# Patient Record
Sex: FEMALE | Race: WHITE | NOT HISPANIC OR LATINO | Employment: FULL TIME | URBAN - METROPOLITAN AREA
[De-identification: names, ages, dates, MRNs, and addresses within clinical notes are randomized per-mention and may not be internally consistent; named-entity substitution may affect disease eponyms.]

---

## 2023-11-02 PROBLEM — G47.33 OBSTRUCTIVE SLEEP APNEA SYNDROME: Status: ACTIVE | Noted: 2023-11-02

## 2023-11-02 PROBLEM — E78.2 MIXED HYPERLIPIDEMIA: Status: ACTIVE | Noted: 2023-11-02

## 2023-11-02 RX ORDER — NAPROXEN 500 MG/1
1 TABLET ORAL 2 TIMES DAILY PRN
COMMUNITY
Start: 2023-08-10 | End: 2023-11-03 | Stop reason: WASHOUT

## 2023-11-02 RX ORDER — DICLOFENAC SODIUM 10 MG/G
4 GEL TOPICAL
COMMUNITY
End: 2023-11-03 | Stop reason: WASHOUT

## 2023-11-02 RX ORDER — NAPROXEN SODIUM 220 MG
220 TABLET ORAL EVERY 12 HOURS PRN
COMMUNITY
End: 2023-11-03 | Stop reason: WASHOUT

## 2023-11-03 ENCOUNTER — HOSPITAL ENCOUNTER (OUTPATIENT)
Dept: RADIOLOGY | Facility: HOSPITAL | Age: 53
Discharge: HOME | End: 2023-11-03
Payer: COMMERCIAL

## 2023-11-03 ENCOUNTER — OFFICE VISIT (OUTPATIENT)
Dept: PRIMARY CARE | Facility: CLINIC | Age: 53
End: 2023-11-03
Payer: COMMERCIAL

## 2023-11-03 VITALS
BODY MASS INDEX: 41.43 KG/M2 | SYSTOLIC BLOOD PRESSURE: 104 MMHG | OXYGEN SATURATION: 97 % | HEART RATE: 108 BPM | DIASTOLIC BLOOD PRESSURE: 88 MMHG | TEMPERATURE: 97.1 F | HEIGHT: 68 IN | WEIGHT: 273.4 LBS

## 2023-11-03 DIAGNOSIS — G89.29 CHRONIC PAIN OF RIGHT WRIST: Primary | ICD-10-CM

## 2023-11-03 DIAGNOSIS — M25.531 CHRONIC PAIN OF RIGHT WRIST: ICD-10-CM

## 2023-11-03 DIAGNOSIS — M25.531 CHRONIC PAIN OF RIGHT WRIST: Primary | ICD-10-CM

## 2023-11-03 DIAGNOSIS — G89.29 CHRONIC PAIN OF RIGHT WRIST: ICD-10-CM

## 2023-11-03 DIAGNOSIS — Z12.31 BREAST CANCER SCREENING BY MAMMOGRAM: ICD-10-CM

## 2023-11-03 PROCEDURE — 73110 X-RAY EXAM OF WRIST: CPT | Mod: RT,FY

## 2023-11-03 PROCEDURE — 99213 OFFICE O/P EST LOW 20 MIN: CPT | Performed by: FAMILY MEDICINE

## 2023-11-03 PROCEDURE — 1036F TOBACCO NON-USER: CPT | Performed by: FAMILY MEDICINE

## 2023-11-03 ASSESSMENT — PAIN SCALES - GENERAL: PAINLEVEL: 8

## 2023-11-03 ASSESSMENT — PATIENT HEALTH QUESTIONNAIRE - PHQ9
2. FEELING DOWN, DEPRESSED OR HOPELESS: NOT AT ALL
1. LITTLE INTEREST OR PLEASURE IN DOING THINGS: NOT AT ALL
SUM OF ALL RESPONSES TO PHQ9 QUESTIONS 1 AND 2: 0

## 2023-11-03 NOTE — PATIENT INSTRUCTIONS
Problem List Items Addressed This Visit    None  Visit Diagnoses         Codes    Chronic pain of right wrist    -  Primary M25.531, G89.29            Additional Visit Plans:  Let us get the xray to see what is going on.    Take 1000mg Tylenol 3 times a day for the next few days. Apply heat and ice as tolerated.    Plan to see the hand specialist.    I do not suspect gout.     Patient Care Team:  Bryon Le DO as PCP - General (Family Medicine)    Bryon Le DO   11/03/23   3:27 PM

## 2023-11-03 NOTE — PROGRESS NOTES
Outpatient Visit Note    Chief Complaint   Patient presents with    Wrist Pain     Has been having R wrist pain for about 2 months on and off       HPI:  Camilla Doty is a 52 y.o. female here for continued wrist pain.    I saw her in  August at which time we discussed that she was having right wrist pain for  1 week.  She felt maybe she lept on it funny,  woke up with her right wrist feeling stiff, pain improved with movement.  No known trauma.  No pain at rest.  Experiencing pain with opening up Or Turning Her Wrist.  The Pain Was More along the Distal Forearm Really Than at Her Wrist.  Some Slight Swelling Just Proximal to the Wrist.  I Diagnosed Her with Tendinitis and Recommended She Wear a Wrist Stabilizer Especially While Sleeping and Use Naproxen As Needed.  Continue Voltaren Gel, Apply Heat and Ice As Tolerated.    She wore the wrist stabilizer for two days. Felt this was hurting more with the pressure so she stopped that. The naproxen did not help much but then she had to stop it as they found some ulcers on her colonoscopy.     She still has the swelling. Has weakness and pain with turning her wrist or pulling down on things.     Using Tylenol intermittently with some relief. Using Asper Cream which is not helping. Voltaren gel did not work. Has not been doing ice and heat.     Pain is not radiating.       History reviewed. No pertinent past medical history.     Current Medications  Current Outpatient Medications   Medication Instructions    multivit-minerals/folic acid (MULTIVITAMIN GUMMIES ORAL) 2 tablets, oral, Daily    mv-mn/iron/folic acid/herb 190 (VITAMIN D3 COMPLETE ORAL) 1 capsule, oral, Daily        Allergies  No Known Allergies     History reviewed. No pertinent surgical history.  Family History   Problem Relation Name Age of Onset    Breast cancer Mother  43    Prostate cancer Father      Hyperlipidemia Father      Arthritis Father      Heart disease Father      Arthritis Sister       Cancer Father's Brother      BRCA1 Positive Father's Brother          BRCA gene mutation positive    Cancer Paternal Grandfather       Social History     Tobacco Use    Smoking status: Never     Passive exposure: Never    Smokeless tobacco: Never   Vaping Use    Vaping Use: Never used   Substance Use Topics    Alcohol use: Yes     Alcohol/week: 2.0 standard drinks of alcohol     Types: 1 Glasses of wine, 1 Shots of liquor per week    Drug use: Never     Tobacco Use: Low Risk  (11/3/2023)    Patient History     Smoking Tobacco Use: Never     Smokeless Tobacco Use: Never     Passive Exposure: Never        ROS  All pertinent positive symptoms are included in the history of present illness.  All other systems have been reviewed and are negative and noncontributory to this patient's current ailments.    VITAL SIGNS  Vitals:    11/03/23 1507   BP: 104/88   Pulse: 108   Temp: 36.2 °C (97.1 °F)   SpO2: 97%     Vitals:    11/03/23 1507   Weight: 124 kg (273 lb 6.4 oz)      Body mass index is 41.57 kg/m².     PHYSICAL EXAM  GENERAL APPEARANCE: well nourished, well developed, looks like stated age, in no acute distress, not ill or tired appearing, conversing well.   HEENT: no trauma, normocephalic.   NECK: supple without rigidity, no neck mass was observed.   LUNGS: good chest wall expansion. In no acute respiratory distress.   EXTREMITIES:   Just proximal to her right posterior wrist there is focal swelling with tenderness, no increased warmth or erythema.  No palpable bony abnormalities.  SKIN: normal skin color and pigmentation, without rash.   NEUROLOGIC EXAM: CN II-XII grossly intact, normal gait.   PSYCH: mood and affect appropriate; alert and oriented to time, place, person; no difficulty with speech or language.       Assessment/Plan   Problem List Items Addressed This Visit    None  Visit Diagnoses         Codes    Chronic pain of right wrist    -  Primary M25.531, G89.29            Additional Visit Plans:  Let us  get the xray to see what is going on.    Take 1000mg Tylenol 3 times a day for the next few days. Apply heat and ice as tolerated.    Plan to see the hand specialist.    I do not suspect gout.     Patient Care Team:  Bryon Le DO as PCP - General (Family Medicine)    Bryon Le DO   11/03/23   3:27 PM

## 2023-11-09 ENCOUNTER — OFFICE VISIT (OUTPATIENT)
Dept: ORTHOPEDIC SURGERY | Facility: CLINIC | Age: 53
End: 2023-11-09
Payer: COMMERCIAL

## 2023-11-09 VITALS — HEIGHT: 68 IN | BODY MASS INDEX: 41.43 KG/M2 | WEIGHT: 273.37 LBS

## 2023-11-09 DIAGNOSIS — G89.29 CHRONIC PAIN OF RIGHT WRIST: ICD-10-CM

## 2023-11-09 DIAGNOSIS — M77.8 THUMB TENDONITIS: ICD-10-CM

## 2023-11-09 DIAGNOSIS — S63.501A RIGHT WRIST SPRAIN, INITIAL ENCOUNTER: Primary | ICD-10-CM

## 2023-11-09 DIAGNOSIS — M25.531 CHRONIC PAIN OF RIGHT WRIST: ICD-10-CM

## 2023-11-09 PROCEDURE — 99213 OFFICE O/P EST LOW 20 MIN: CPT | Performed by: ORTHOPAEDIC SURGERY

## 2023-11-09 PROCEDURE — 99203 OFFICE O/P NEW LOW 30 MIN: CPT | Performed by: ORTHOPAEDIC SURGERY

## 2023-11-09 PROCEDURE — 1036F TOBACCO NON-USER: CPT | Performed by: ORTHOPAEDIC SURGERY

## 2023-11-09 ASSESSMENT — PAIN SCALES - GENERAL: PAINLEVEL_OUTOF10: 8

## 2023-11-09 ASSESSMENT — PAIN DESCRIPTION - DESCRIPTORS: DESCRIPTORS: ACHING;THROBBING

## 2023-11-09 ASSESSMENT — ENCOUNTER SYMPTOMS
DEPRESSION: 0
OCCASIONAL FEELINGS OF UNSTEADINESS: 0
LOSS OF SENSATION IN FEET: 0

## 2023-11-09 ASSESSMENT — PATIENT HEALTH QUESTIONNAIRE - PHQ9
SUM OF ALL RESPONSES TO PHQ9 QUESTIONS 1 AND 2: 0
1. LITTLE INTEREST OR PLEASURE IN DOING THINGS: NOT AT ALL
2. FEELING DOWN, DEPRESSED OR HOPELESS: NOT AT ALL

## 2023-11-09 ASSESSMENT — PAIN - FUNCTIONAL ASSESSMENT: PAIN_FUNCTIONAL_ASSESSMENT: 0-10

## 2023-11-09 NOTE — PROGRESS NOTES
Subjective      Chief Complaint   Patient presents with    Right Wrist - Pain        No surgery found     HPI:    This 52-year-old young woman presents today with right wrist pain (6-7/10) and swelling both of which are worse with and aggravated by activities including using her wrist.  There is no known trauma.  This right wrist pain and swelling impair this patient's ability to do activities of daily living that are important to her occluding participating in activities with her family.    CARDIOLOGY:   Negative for chest pain, shortness of breath.   RESPIRATORY:   Negative for chest pain, shortness of breath.   MUSCULOSKELETAL:   See HPI for details.   NEUROLOGY:   Negative for tingling, numbness, weakness.    Objective    There were no vitals filed for this visit.    Physical Exam  GENERAL:          General Appearance:  This is a pleasant patient with appropriate affect, in no acute distress.   DERMATOLOGY:          Skin: skin at the neck, upper and lower back, and trunk is intact. There is no evidence of skin rash, skin breakdown or ulceration, or atrophic skin change.   EXTREMITIES:          Vascular:  Right, left hands and feet are warm with good color and pulses. Right and left calf and thigh are nontender and nonswollen.   NEUROLOGICAL:          Orientation:  Patient is alert and oriented to person, place, time and situation. Right and left upper and lower extremity motor and sensory examinations are intact.      MUSCULOSKELETAL: Neck: Nontender. No pain with range of motion. Right hand: Mild tenderness at the dorsum right thumb. Finkelstein's is negative. Phalen's is negative. No pain with gentle ROM at the right hand fingers. Neurovascular is intact.  X-rays of the right wrist done at [facility] on 11-3-2023 and listed below are reviewed with the patient in the office today and are negative for fracture or destruction.    XR wrist right 3+ views    Result Date: 11/4/2023  Interpreted By:  Villa Pool,  STUDY: XR WRIST RIGHT 3+ VIEWS; 11/3/2023 4:16 pm   INDICATION: Signs/Symptoms:chronic right wrist pain (just proximal to the posterior right wrist) with swelling.   COMPARISON: None.   ACCESSION NUMBER(S): [id number]   ORDERING CLINICIAN: GRIFFIN CHING   TECHNIQUE: Right wrist three views   FINDINGS: No fractures or destructive lesions are identified. Joint spaces are maintained. There is no evidence for chondrocalcinosis. No erosions are identified.       No acute pathologic findings are identified.   MACRO: none   Signed by: Villa Pool 11/4/2023 8:50 AM Dictation workstation:   [id number]       Camilla was seen today for pain.  Diagnoses and all orders for this visit:  Right wrist sprain, initial encounter (Primary)  Chronic pain of right wrist  -     Referral to Orthopaedic Surgery  Options are discussed with the patient in detail. The patient is given a prescription for physical therapy to evaluate and treat with gentle strengthening and ROM exercises with modalities as needed. The patient is instructed regarding activity modification and risk for further injury with falling or trauma, ice, physician directed at home gentle strengthening and ROM exercises, and the appropriate use of Tylenol as needed for pain with its potential adverse reactions and side effects. The patient understands.  Return as needed please note that this report has been produced using speech recognition software.  It may contain errors related to grammar, punctuation or spelling.  Electronically signed, but not reviewed.    Chaitanya Gipson MD

## 2023-12-02 ENCOUNTER — HOSPITAL ENCOUNTER (OUTPATIENT)
Dept: RADIOLOGY | Facility: HOSPITAL | Age: 53
Discharge: HOME | End: 2023-12-02
Payer: COMMERCIAL

## 2023-12-02 VITALS — HEIGHT: 68 IN | WEIGHT: 270 LBS | BODY MASS INDEX: 40.92 KG/M2

## 2023-12-02 DIAGNOSIS — Z12.31 BREAST CANCER SCREENING BY MAMMOGRAM: ICD-10-CM

## 2023-12-02 PROCEDURE — 77063 BREAST TOMOSYNTHESIS BI: CPT

## 2023-12-07 ENCOUNTER — EVALUATION (OUTPATIENT)
Dept: OCCUPATIONAL THERAPY | Facility: CLINIC | Age: 53
End: 2023-12-07
Payer: COMMERCIAL

## 2023-12-07 DIAGNOSIS — M25.539 PAIN, WRIST: Primary | ICD-10-CM

## 2023-12-07 DIAGNOSIS — S63.501A RIGHT WRIST SPRAIN, INITIAL ENCOUNTER: ICD-10-CM

## 2023-12-07 DIAGNOSIS — M25.531 RIGHT WRIST PAIN: ICD-10-CM

## 2023-12-07 PROCEDURE — 97110 THERAPEUTIC EXERCISES: CPT | Mod: GO | Performed by: OCCUPATIONAL THERAPIST

## 2023-12-07 PROCEDURE — 97165 OT EVAL LOW COMPLEX 30 MIN: CPT | Mod: GO | Performed by: OCCUPATIONAL THERAPIST

## 2023-12-07 PROCEDURE — L3808 WHFO, RIGID W/O JOINTS: HCPCS | Performed by: OCCUPATIONAL THERAPIST

## 2023-12-07 ASSESSMENT — ACTIVITIES OF DAILY LIVING (ADL): HOME_MANAGEMENT_TIME_ENTRY: 5

## 2023-12-07 ASSESSMENT — PAIN DESCRIPTION - DESCRIPTORS: DESCRIPTORS: ACHING;PINS AND NEEDLES;TINGLING

## 2023-12-07 ASSESSMENT — PAIN - FUNCTIONAL ASSESSMENT: PAIN_FUNCTIONAL_ASSESSMENT: 0-10

## 2023-12-07 ASSESSMENT — PAIN SCALES - GENERAL: PAINLEVEL_OUTOF10: 7

## 2023-12-07 NOTE — PROGRESS NOTES
Occupational Therapy    Evaluation/Treatment    Patient Name: Camilla Doty  MRN: [id number]  : 1970  Today's Date: 23     Time Calculation  Start Time: 1620  Stop Time: 1715  Time Calculation (min): 55 min    Assessment:     OT Assessment Results: Decreased ADL status, Decreased upper extremity range of motion, Decreased upper extremity strength, Decreased fine motor control, Decreased IADLs  Strengths: Ability to acquire knowledge, Coping skills, Insight into problems  Plan:  1 x week for 6 weeks          Subjective   Current Problem:  1. Pain, wrist  PT eval and treat    Follow Up In Occupational Therapy      2. Right wrist sprain, initial encounter  Follow Up In Occupational Therapy      3. Right wrist pain          General:   OT Received On: 23  General  Reason for Referral: ADL impairment R dominant hand with work and IADL  Referred By: Dr. Gipson  Past Medical History Relevant to Rehab: Unknown cause of onset of pain right wrist  Preferred Learning Style: verbal, visual, written  General Comment: pt has family medical hx of RA, but patient herself does not have dx  Precautions:  Splinting: pt has prefab wrist splint  Precautions Comment: pain with prefab wrist brace, unable to use       Pain:  Pain Assessment  Pain Assessment: 0-10  Pain Score: 7  Pain Type: Acute pain  Pain Location: Wrist  Pain Orientation: Right  Pain Descriptors: Aching, Pins and needles, Tingling  Pain Frequency: Intermittent  Clinical Progression: Not changed    Objective   Cognition:  Overall Cognitive Status: Within Functional Limits           Home Living:  Type of Home: House  Lives With: Spouse, Dependent children  Prior Function:  Level of [place]: Independent with ADLs and functional transfers, Independent with homemaking with ambulation  Hand Dominance: Right  IADL History:  Occupation: Full time employment, Works at home  Type of Occupation: office work, [employer]  Leisure and Hobbies: used  to work out, has elipical,   ADL:  ADL Comments: UEFI completed score 23/80 see for details    Modalities:  Modalities Used:  (Recommending patient use ice and heat for assist with pain control, education regarding care and precautions)  Splinting:  Location: Right thumb spica long forearm based  Type: WHFO  Splinting: [employer] Fabrication  Splinting Education: Fitting, Donning, [place], Wear schedule, Precautions  Splinting Comments: Educated regarding healing and timeframe with support and direction       Therapy/Activity: Therapeutic Exercise  Therapeutic Exercise Performed: Yes  Therapeutic Exercise Activity 1: AROM of wrist flexion and extension 10 reps; no stretching, no endrange holds; avoid gripping without splint on; avoid pull, push or lift with force until less pain; recommending hot/cold for wrist to control pain and edema with splint.         Manual Therapy  Manual Therapy Performed: Yes  Manual Therapy Activity 1: STM , edema retrograde massage; passive assist with ROM; no joint mob  Sensation:  Light Touch: Partial deficits in the RUE (pt reports small finger gets numb, also tingly/ numbness in fingers)  Further measure with Carlisle Davion next visit  Strength:  Strength Comments:  strength (Right  5 lbs.; Left  55 lbs.; * pain with loading)  Right key pinch 5 lbs.; Left key pinch 11 lbs.  Right 3 jaw rosie 2 lbs.; Left 3 jaw rosie 10 lbs.       Hand Function:  Hand Function  Gross Grasp: Functional  Outcome Measures:  UEFI score 23/80 see for details      OP EDUCATION:  Education  Individual(s) Educated: Patient  Education Provided: Diagnosis & Precautions, Symptom management, Joint protection and energy conservation, Orthotics wearing schedule and precautions, Risk and benefits of OT discussed with patient or other, POC discussed and agreed upon  Home Program: AROM, Activity modification, Modalities, Orthotic wearing schedule, care and precautions, Handout  issued  Diagnosis and Precautions: negative for Dequervain's; positive CMC OA; positive for scapho/ligament injury; neg for scaphoid; positive for pain with palpation at distal radius;  Right wrist pain and dysfunction with inadequate orthotic support  Risk and Benefits Discussed with Patient/Caregiver/Other: yes  Patient/Caregiver Demonstrated Understanding: yes  Plan of Care Discussed and Agreed Upon: yes  Patient Response to Education: Patient/Caregiver Verbalized Understanding of Information, Patient/Caregiver Performed Return Demonstration of Exercises/Activities, Patient/Caregiver Asked Appropriate Questions    Goals:  Active       OT Problem       Patient will complete carrying groceries with R hand without pain;       Start:  12/07/23    Expected End:  02/07/24            PATIENT WILL ACHIEVE right  STRENGTH OF 15 lbs.  IN THE two position       Start:  12/07/23    Expected End:  02/07/24            PATIENT WILL ACHIEVE right KEY and 3 jaw rosie PINCH STRENGTH OF 10 lbs.       Start:  12/07/23    Expected End:  02/07/24            PATIENT WILL ACHIEVE right WRIST EXT/FLEXION ROM 55/60  DEGREES       Start:  12/07/23    Expected End:  02/07/24            PATIENT WILL DEMONSTRATE ROM MKI 8/10 of THUMB TO 5TH DIGIT        Start:  12/07/23    Expected End:  02/07/24            PATIENT WILL SHOW A SIGNIFICANT CHANGE IN UEFI PATIENT REPORTED OUTCOME TOOL TO DEMONSTRATE SUBJECTIVE IMPROVEMENT       Start:  12/07/23    Expected End:  02/07/24            PATIENT WILL REPORT PAIN OF 2/10 DEMONSTRATING A REDUCTION OF OVERALL PAIN (Progressing)       Start:  12/07/23    Expected End:  02/07/24            PATIENT WILL DEMONSTRATE INDEPENDENCE IN HOME PROGRAM FOR SUPPORT OF PROGRESSION (Progressing)       Start:  12/07/23    Expected End:  02/07/24            PATIENT WILL DEMONSTRATE ABILITY TO FOLLOW wrist sprain injury PRECAUTIONS INDEPENDENTLY (Progressing)       Start:  12/07/23    Expected End:  02/07/24

## 2024-01-04 ENCOUNTER — TREATMENT (OUTPATIENT)
Dept: OCCUPATIONAL THERAPY | Facility: CLINIC | Age: 54
End: 2024-01-04
Payer: COMMERCIAL

## 2024-01-04 DIAGNOSIS — S63.501A RIGHT WRIST SPRAIN, INITIAL ENCOUNTER: ICD-10-CM

## 2024-01-04 DIAGNOSIS — M25.539 PAIN, WRIST: ICD-10-CM

## 2024-01-04 PROCEDURE — 97530 THERAPEUTIC ACTIVITIES: CPT | Mod: GO | Performed by: OCCUPATIONAL THERAPIST

## 2024-01-04 PROCEDURE — 97110 THERAPEUTIC EXERCISES: CPT | Mod: GO | Performed by: OCCUPATIONAL THERAPIST

## 2024-01-04 PROCEDURE — 97140 MANUAL THERAPY 1/> REGIONS: CPT | Mod: GO | Performed by: OCCUPATIONAL THERAPIST

## 2024-01-04 ASSESSMENT — PAIN - FUNCTIONAL ASSESSMENT: PAIN_FUNCTIONAL_ASSESSMENT: 0-10

## 2024-01-04 ASSESSMENT — PAIN SCALES - GENERAL: PAINLEVEL_OUTOF10: 6

## 2024-01-04 NOTE — PROGRESS NOTES
Occupational Therapy    Treatment    Patient Name: Camilla Doty  MRN: [id number]  : 1970  Today's Date: 24     Time Calculation  Start Time: 1645  Stop Time: 1730  Time Calculation (min): 45 min    Assessment:     OT Assessment Results: Decreased ADL status, Decreased upper extremity range of motion, Decreased upper extremity strength, Decreased fine motor control, Decreased IADLs  Strengths: Ability to acquire knowledge  Plan:  1 x week for 6 weeks, this is her second visit due to travel and holidays.          Subjective   Current Problem:  1. Pain, wrist  Follow Up In Occupational Therapy      2. Right wrist sprain, initial encounter  Follow Up In Occupational Therapy        General:      General  Reason for Referral: ADL impairment R dominant hand with work and IADL  Referred By: Dr. Gipson  Past Medical History Relevant to Rehab: Unknown cause of onset of pain right wrist  Preferred Learning Style: verbal, visual, written  General Comment: pt has family medical hx of RA, but patient herself does not have dx  Precautions:  Precautions Comment: pain with prefab wrist brace, unable to use       Pain:  Pain Assessment  Pain Assessment: 0-10  Pain Score: 6  Pain Type: Chronic pain  Pain Location: Wrist  Pain Orientation: Right  Pain Descriptors: Aching, Nagging, Tender, Sharp, Shooting, Sore  Pain Frequency: Constant/continuous  Clinical Progression: Not changed    Objective  Pain better with patient using thumb spica at night, able to sleep, she reports overdoing it with holiday cooking, also pain with active use. Discussed comfort cool restriction for computer and daily use.    Modalities:  Modalities Used:  (Recommending patient use ice and heat for assist with pain control, education regarding care and precautions)  Splinting:  Splinting Education: Fitting, Donning, [place], Wear schedule, Precautions  Splinting Prefabricated: Other (Comment)  Splinting Comments: hand based comfort cool Right  medium plus       Therapy/Activity: Therapeutic Exercise  Therapeutic Exercise Performed: Yes  Therapeutic Exercise Activity 1: AROM of wrist flexion and extension 10 reps; no stretching, no endrange holds; avoid gripping without splint on; avoid pull, push or lift with force until less pain; recommending hot/cold for wrist to control pain and edema with splint.    Therapeutic Activity  Therapeutic Activity Performed: Yes  Therapeutic Activity 1: wrist Abc's with thumb relaxed and fingers relaxed x 2 (completed and performed with fluidotherapy and direct therapist verbal cues and direction)    Manual Therapy  Manual Therapy Performed: Yes  Manual Therapy Activity 1: STM , edema retrograde massage; passive assist with ROM; no joint mob  Sensation: intact to light touch     Strength: Current measures     Right key pinch 5 lbs.; Left key pinch 11 lbs.  Right 3 jaw rosie 2 lbs.; Left 3 jaw rosie 10 lbs.       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; positive CMC OA and FCR tunnel tendinitis; positive for scapho/ligament injury; neg for scaphoid; positive for pain with palpation at distal radius;  Right wrist pain and dysfunction with inadequate orthotic support  Risk and Benefits Discussed with Patient/Caregiver/Other: yes  Patient/Caregiver Demonstrated Understanding: yes  Plan of Care Discussed and Agreed Upon: yes  Patient Response to Education:  Patient/Caregiver Verbalized Understanding of Information, Patient/Caregiver Performed Return Demonstration of Exercises/Activities, Patient/Caregiver Asked Appropriate Questions    Goals:  Active       OT Problem       Patient will complete carrying groceries with R hand without pain; (Progressing)       Start:  12/07/23    Expected End:  02/07/24            PATIENT WILL ACHIEVE right  STRENGTH OF 15 lbs.  IN THE two position       Start:  12/07/23    Expected End:  02/07/24            PATIENT WILL ACHIEVE right KEY and 3 jaw rosie PINCH STRENGTH OF 10 lbs.       Start:  12/07/23    Expected End:  02/07/24            PATIENT WILL ACHIEVE right WRIST EXT/FLEXION ROM 55/60  DEGREES (Progressing)       Start:  12/07/23    Expected End:  02/07/24            PATIENT WILL DEMONSTRATE ROM MKI 8/10 of THUMB TO 5TH DIGIT        Start:  12/07/23    Expected End:  02/07/24            PATIENT WILL SHOW A SIGNIFICANT CHANGE IN UEFI PATIENT REPORTED OUTCOME TOOL TO DEMONSTRATE SUBJECTIVE IMPROVEMENT (Progressing)       Start:  12/07/23    Expected End:  02/07/24            PATIENT WILL REPORT PAIN OF 2/10 DEMONSTRATING A REDUCTION OF OVERALL PAIN (Progressing)       Start:  12/07/23    Expected End:  02/07/24            PATIENT WILL DEMONSTRATE INDEPENDENCE IN HOME PROGRAM FOR SUPPORT OF PROGRESSION (Progressing)       Start:  12/07/23    Expected End:  02/07/24            PATIENT WILL DEMONSTRATE ABILITY TO FOLLOW wrist sprain injury PRECAUTIONS INDEPENDENTLY (Progressing)       Start:  12/07/23    Expected End:  02/07/24

## 2024-01-11 ENCOUNTER — TREATMENT (OUTPATIENT)
Dept: OCCUPATIONAL THERAPY | Facility: CLINIC | Age: 54
End: 2024-01-11
Payer: COMMERCIAL

## 2024-01-11 DIAGNOSIS — M25.539 PAIN, WRIST: ICD-10-CM

## 2024-01-11 DIAGNOSIS — S63.501A RIGHT WRIST SPRAIN, INITIAL ENCOUNTER: ICD-10-CM

## 2024-01-11 PROCEDURE — 97110 THERAPEUTIC EXERCISES: CPT | Mod: GO | Performed by: OCCUPATIONAL THERAPIST

## 2024-01-11 PROCEDURE — 97140 MANUAL THERAPY 1/> REGIONS: CPT | Mod: GO | Performed by: OCCUPATIONAL THERAPIST

## 2024-01-11 PROCEDURE — 97530 THERAPEUTIC ACTIVITIES: CPT | Mod: GO | Performed by: OCCUPATIONAL THERAPIST

## 2024-01-11 ASSESSMENT — PAIN DESCRIPTION - DESCRIPTORS: DESCRIPTORS: ACHING;BURNING

## 2024-01-11 ASSESSMENT — PAIN - FUNCTIONAL ASSESSMENT: PAIN_FUNCTIONAL_ASSESSMENT: 0-10

## 2024-01-11 ASSESSMENT — PAIN SCALES - GENERAL: PAINLEVEL_OUTOF10: 5 - MODERATE PAIN

## 2024-01-11 NOTE — PROGRESS NOTES
Occupational Therapy    Treatment    Patient Name: Camilla Doty  MRN: [id number]  : 1970  Today's Date: 24     Time Calculation  Start Time: 1615  Stop Time: 1700  Time Calculation (min): 45 min    Assessment: Patient less edema and pain with thumb spica and work positioning.     OT Assessment Results: Decreased ADL status, Decreased upper extremity range of motion, Decreased upper extremity strength, Decreased fine motor control, Decreased IADLs  Strengths: Ability to acquire knowledge  Plan:  1 x week for 6 weeks, this is her third visit due to travel and holidays.          Subjective   Current Problem:  1. Pain, wrist  Follow Up In Occupational Therapy      2. Right wrist sprain, initial encounter  Follow Up In Occupational Therapy        General:      General  Reason for Referral: ADL impairment R dominant hand with work and IADL  Referred By: Dr. Gipson  Past Medical History Relevant to Rehab: Unknown cause of onset of pain right wrist  Preferred Learning Style: verbal, visual, written  General Comment: pt has family medical hx of RA, but patient herself does not have dx  Precautions:  Precautions Comment: pain with prefab wrist brace, unable to use       Pain:  Pain Assessment  Pain Assessment: 0-10  Pain Score: 5 - Moderate pain  Pain Type: Chronic pain  Pain Location: Wrist  Pain Orientation: Right  Pain Descriptors: Aching, Burning  Clinical Progression: Not changed    Objective  Pain better with patient using thumb spica at night, able to sleep, but continues to report long work hours, and still overdoing it without breaks. Pain with active use. Discussed comfort cool restriction for computer and daily use.    Modalities:  Modalities Used: Yes (Recommending patient use ice and heat for assist with pain control, education regarding care and precautions)  Modality 1: Timed Ultrasound  Splinting:  Splinting Education: Fitting, [place], [place], Wear schedule, Precautions  Splinting  Prefabricated: Other (Comment)  Splinting Comments: prefab thumb spica       Therapy/Activity: Therapeutic Exercise  Therapeutic Exercise Performed: Yes  Therapeutic Exercise Activity 1: AROM of wrist flexion and extension 10 reps; no stretching, no endrange holds; (avoid gripping without splint on; avoid pull, push or lift with force) Pt completed 2 x 10 reps of wrist flexion /ext and thumb index pinch with wrist flex ext, with warm up in fluidotherapy with direct therapist supervision    Therapeutic Activity  Therapeutic Activity Performed: Yes  Therapeutic Activity 1: rubberband jar x 15 reps; wrist can roll with 10 x 10 count holds and with passive assist flex/ext wrist AAROM painfree stretching as tolerated.    Manual Therapy  Manual Therapy Performed: Yes  Manual Therapy Activity 1: STM , edema retrograde massage; passive assist with ROM; no joint mob  Sensation: intact to light touch     Strength: Current measures     Right key pinch 5 lbs.; Left key pinch 11 lbs.  Right 3 jaw rosie 2 lbs.; Left 3 jaw rosei 10 lbs.       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; positive CMC OA and FCR tunnel tendinitis; positive for scapho/ligament injury; neg for scaphoid; positive for pain with palpation at distal radius;  Right wrist pain and dysfunction with inadequate orthotic support  Risk and Benefits Discussed with Patient/Caregiver/Other:  yes  Patient/Caregiver Demonstrated Understanding: yes  Plan of Care Discussed and Agreed Upon: yes  Patient Response to Education: Patient/Caregiver Verbalized Understanding of Information, Patient/Caregiver Performed Return Demonstration of Exercises/Activities, Patient/Caregiver Asked Appropriate Questions    Goals:  Active       OT Problem       Patient will complete carrying groceries with R hand without pain; (Progressing)       Start:  12/07/23    Expected End:  02/07/24            PATIENT WILL ACHIEVE right  STRENGTH OF 15 lbs.  IN THE two position (Progressing)       Start:  12/07/23    Expected End:  02/07/24            PATIENT WILL ACHIEVE right KEY and 3 jaw rosie PINCH STRENGTH OF 10 lbs. (Progressing)       Start:  12/07/23    Expected End:  02/07/24            PATIENT WILL ACHIEVE right WRIST EXT/FLEXION ROM 55/60  DEGREES (Progressing)       Start:  12/07/23    Expected End:  02/07/24            PATIENT WILL DEMONSTRATE ROM MKI 8/10 of THUMB TO 5TH DIGIT  (Progressing)       Start:  12/07/23    Expected End:  02/07/24            PATIENT WILL SHOW A SIGNIFICANT CHANGE IN UEFI PATIENT REPORTED OUTCOME TOOL TO DEMONSTRATE SUBJECTIVE IMPROVEMENT (Progressing)       Start:  12/07/23    Expected End:  02/07/24            PATIENT WILL REPORT PAIN OF 2/10 DEMONSTRATING A REDUCTION OF OVERALL PAIN (Progressing)       Start:  12/07/23    Expected End:  02/07/24            PATIENT WILL DEMONSTRATE INDEPENDENCE IN HOME PROGRAM FOR SUPPORT OF PROGRESSION (Progressing)       Start:  12/07/23    Expected End:  02/07/24            PATIENT WILL DEMONSTRATE ABILITY TO FOLLOW wrist sprain injury PRECAUTIONS INDEPENDENTLY (Progressing)       Start:  12/07/23    Expected End:  02/07/24

## 2024-01-12 ENCOUNTER — PROCEDURE VISIT (OUTPATIENT)
Dept: ORTHOPEDIC SURGERY | Facility: CLINIC | Age: 54
End: 2024-01-12
Payer: COMMERCIAL

## 2024-01-12 PROCEDURE — L3924 HFO WITHOUT JOINTS PRE OTS: HCPCS | Performed by: STUDENT IN AN ORGANIZED HEALTH CARE EDUCATION/TRAINING PROGRAM

## 2024-01-18 ENCOUNTER — TREATMENT (OUTPATIENT)
Dept: OCCUPATIONAL THERAPY | Facility: CLINIC | Age: 54
End: 2024-01-18
Payer: COMMERCIAL

## 2024-01-18 DIAGNOSIS — M25.531 RIGHT WRIST PAIN: Primary | ICD-10-CM

## 2024-01-18 DIAGNOSIS — S63.501A RIGHT WRIST SPRAIN, INITIAL ENCOUNTER: ICD-10-CM

## 2024-01-18 DIAGNOSIS — M25.539 PAIN, WRIST: ICD-10-CM

## 2024-01-18 PROCEDURE — 97140 MANUAL THERAPY 1/> REGIONS: CPT | Mod: GO | Performed by: OCCUPATIONAL THERAPIST

## 2024-01-18 PROCEDURE — 97110 THERAPEUTIC EXERCISES: CPT | Mod: GO | Performed by: OCCUPATIONAL THERAPIST

## 2024-01-18 PROCEDURE — 97530 THERAPEUTIC ACTIVITIES: CPT | Mod: GO | Performed by: OCCUPATIONAL THERAPIST

## 2024-01-18 ASSESSMENT — PAIN - FUNCTIONAL ASSESSMENT: PAIN_FUNCTIONAL_ASSESSMENT: 0-10

## 2024-01-18 ASSESSMENT — PAIN SCALES - GENERAL: PAINLEVEL_OUTOF10: 7

## 2024-01-18 ASSESSMENT — PAIN DESCRIPTION - DESCRIPTORS: DESCRIPTORS: ACHING;BURNING

## 2024-01-18 NOTE — PROGRESS NOTES
Occupational Therapy    Treatment    Patient Name: Camilla Doty  MRN: [id number]  : 1970  Today's Date: 24     Time Calculation  Start Time: 1610  Stop Time: 1715  Time Calculation (min): 65 min    Assessment: Patient less edema and pain with thumb spica and work positioning.     OT Assessment Results: Decreased ADL status, Decreased upper extremity range of motion, Decreased upper extremity strength, Decreased fine motor control, Decreased IADLs  Plan:  1 x week for 6 weeks, this is her 4 th visit           Subjective   Current Problem:  1. Right wrist pain        2. Pain, wrist  Follow Up In Occupational Therapy      3. Right wrist sprain, initial encounter  Follow Up In Occupational Therapy        General:      General  Reason for Referral: ADL impairment R dominant hand with work and IADL  Referred By: Dr. Gipson  Past Medical History Relevant to Rehab: Unknown cause of onset of pain right wrist  Preferred Learning Style: verbal, visual, written  General Comment: pt has family medical hx of RA, but patient herself does not have dx  Precautions:  Precautions Comment: pain with prefab wrist brace, unable to use, but having a little more success with pain control with thumb spica       Pain:  Pain Assessment  Pain Assessment: 0-10  Pain Score: 7  Pain Type: Chronic pain  Pain Location: Arm  Pain Orientation: Right  Pain Descriptors: Aching, Burning  Clinical Progression: Not changed  Effect of Pain on Daily Activities: a little better with work and use of thumb spica    Objective  Pain better with patient using thumb spica during work she feels she has less pain with work; better with sleep, but continues to report long work hours, and still overdoing it without breaks. Pain with active use. Continue thumb spica comfort cool restriction for computer and daily use.    Modalities:  Modalities Used: Yes (Recommending patient use ice and heat for assist with pain control, education regarding care and  precautions)  Modality 1: Untimed Fluidotherapy  Splinting:  Splinting Education: Precautions, Wear schedule       Therapy/Activity: Therapeutic Exercise  Therapeutic Exercise Performed: Yes  Therapeutic Exercise Activity 1: AROM of wrist flexion and extension 10 reps; no stretching, no endrange holds; (avoid gripping without splint on; avoid pull, push or lift with force) Pt completed 2 x 10 reps of wrist flexion /ext and thumb index pinch with wrist flex ext, with warm up in fluidotherapy with direct therapist supervision  Therapeutic Exercise Activity 2: performed right UE AROM with pulleys 3 mins scaption    Therapeutic Activity  Therapeutic Activity Performed: Yes  Therapeutic Activity 1: completed 8- 12 clothespins pinch key and tip  foam, yellow light resistance, and gripper, tolerated fair, completed 10-12 reps. good effort (no rotation with gripper/hand helper 5 lbs.)  Therapeutic Activity 2: trial of red light rotation with flex bar, pronation/supination    Manual Therapy  Manual Therapy Performed: Yes  Manual Therapy Activity 1: STM , edema retrograde massage; passive assist with ROM; fair tolerance of light joint mob to right wrist joint/carpus glide  Sensation: intact to light touch     Strength: Current measures Current strength measurements     Right key pinch 5 lbs.; Left key pinch 11 lbs.  Right 3 jaw rosie 2 lbs.; Left 3 jaw rosie 10 lbs.       Hand Function: right hand impaired     Outcome Measures:  UEFI score 23/80 see for details      OP EDUCATION:  Education  Individual(s) Educated: Patient  Education Provided: Diagnosis & Precautions, Symptom management, Joint protection and energy conservation, Orthotics wearing schedule and precautions, Risk and benefits of OT discussed with patient or other, POC discussed and agreed upon  Home Program: AROM, Activity modification, Modalities, Orthotic wearing schedule, care and precautions, Handout issued  Diagnosis and Precautions: Positive for  intersection syndrome with palpation/negative for Dequervain's; positive CMC OA and FCR tunnel tendinitis; positive for scapho/ligament injury; neg for scaphoid; positive for pain with palpation at distal radius;  Right wrist pain and dysfunction with inadequate orthotic support  Risk and Benefits Discussed with Patient/Caregiver/Other: yes  Patient/Caregiver Demonstrated Understanding: yes  Plan of Care Discussed and Agreed Upon: yes  Patient Response to Education: Patient/Caregiver Verbalized Understanding of Information, Patient/Caregiver Performed Return Demonstration of Exercises/Activities, Patient/Caregiver Asked Appropriate Questions    Goals:  Active       OT Problem       Patient will complete all goals as listed in evaluation, see notes and attached goals. (Progressing)       Start:  01/18/24    Expected End:  02/07/24

## 2024-01-25 ENCOUNTER — APPOINTMENT (OUTPATIENT)
Dept: OCCUPATIONAL THERAPY | Facility: CLINIC | Age: 54
End: 2024-01-25
Payer: COMMERCIAL

## 2024-01-25 ENCOUNTER — LAB (OUTPATIENT)
Dept: LAB | Facility: LAB | Age: 54
End: 2024-01-25
Payer: COMMERCIAL

## 2024-01-25 DIAGNOSIS — M25.531 PAIN IN RIGHT WRIST: Primary | ICD-10-CM

## 2024-01-25 LAB
BASOPHILS # BLD AUTO: 0.07 X10*3/UL (ref 0–0.1)
BASOPHILS NFR BLD AUTO: 0.9 %
CRP SERPL-MCNC: 0.6 MG/DL (ref 0–2)
EOSINOPHIL # BLD AUTO: 0.16 X10*3/UL (ref 0–0.7)
EOSINOPHIL NFR BLD AUTO: 2.1 %
ERYTHROCYTE [DISTWIDTH] IN BLOOD BY AUTOMATED COUNT: 13.7 % (ref 11.5–14.5)
ERYTHROCYTE [SEDIMENTATION RATE] IN BLOOD BY WESTERGREN METHOD: 46 MM/H (ref 0–30)
HCT VFR BLD AUTO: 38.9 % (ref 36–46)
HGB BLD-MCNC: 12.5 G/DL (ref 12–16)
IMM GRANULOCYTES # BLD AUTO: 0.02 X10*3/UL (ref 0–0.7)
IMM GRANULOCYTES NFR BLD AUTO: 0.3 % (ref 0–0.9)
LYMPHOCYTES # BLD AUTO: 1.87 X10*3/UL (ref 1.2–4.8)
LYMPHOCYTES NFR BLD AUTO: 25 %
MCH RBC QN AUTO: 27.7 PG (ref 26–34)
MCHC RBC AUTO-ENTMCNC: 32.1 G/DL (ref 32–36)
MCV RBC AUTO: 86 FL (ref 80–100)
MONOCYTES # BLD AUTO: 0.46 X10*3/UL (ref 0.1–1)
MONOCYTES NFR BLD AUTO: 6.1 %
NEUTROPHILS # BLD AUTO: 4.9 X10*3/UL (ref 1.2–7.7)
NEUTROPHILS NFR BLD AUTO: 65.6 %
NRBC BLD-RTO: 0 /100 WBCS (ref 0–0)
PLATELET # BLD AUTO: 363 X10*3/UL (ref 150–450)
RBC # BLD AUTO: 4.51 X10*6/UL (ref 4–5.2)
URATE SERPL-MCNC: 3.5 MG/DL (ref 2.5–6.8)
WBC # BLD AUTO: 7.5 X10*3/UL (ref 4.4–11.3)

## 2024-01-25 PROCEDURE — 86038 ANTINUCLEAR ANTIBODIES: CPT

## 2024-01-25 PROCEDURE — 85652 RBC SED RATE AUTOMATED: CPT

## 2024-01-25 PROCEDURE — 85025 COMPLETE CBC W/AUTO DIFF WBC: CPT

## 2024-01-25 PROCEDURE — 36415 COLL VENOUS BLD VENIPUNCTURE: CPT

## 2024-01-25 PROCEDURE — 84550 ASSAY OF BLOOD/URIC ACID: CPT

## 2024-01-25 PROCEDURE — 86140 C-REACTIVE PROTEIN: CPT

## 2024-01-25 PROCEDURE — 86431 RHEUMATOID FACTOR QUANT: CPT

## 2024-01-26 LAB
ANA SER QL HEP2 SUBST: NEGATIVE
RHEUMATOID FACT SER NEPH-ACNC: 13 IU/ML (ref 0–15)

## 2024-02-01 ENCOUNTER — TREATMENT (OUTPATIENT)
Dept: OCCUPATIONAL THERAPY | Facility: CLINIC | Age: 54
End: 2024-02-01
Payer: COMMERCIAL

## 2024-02-01 DIAGNOSIS — M25.531 RIGHT WRIST PAIN: Primary | ICD-10-CM

## 2024-02-01 DIAGNOSIS — M25.539 PAIN, WRIST: ICD-10-CM

## 2024-02-01 DIAGNOSIS — S63.501A RIGHT WRIST SPRAIN, INITIAL ENCOUNTER: ICD-10-CM

## 2024-02-01 PROCEDURE — 97140 MANUAL THERAPY 1/> REGIONS: CPT | Mod: GO | Performed by: OCCUPATIONAL THERAPIST

## 2024-02-01 PROCEDURE — 97110 THERAPEUTIC EXERCISES: CPT | Mod: GO | Performed by: OCCUPATIONAL THERAPIST

## 2024-02-01 PROCEDURE — 97530 THERAPEUTIC ACTIVITIES: CPT | Mod: GO | Performed by: OCCUPATIONAL THERAPIST

## 2024-02-01 ASSESSMENT — PAIN - FUNCTIONAL ASSESSMENT: PAIN_FUNCTIONAL_ASSESSMENT: 0-10

## 2024-02-01 ASSESSMENT — PAIN SCALES - GENERAL: PAINLEVEL_OUTOF10: 5 - MODERATE PAIN

## 2024-02-01 ASSESSMENT — PAIN DESCRIPTION - DESCRIPTORS: DESCRIPTORS: ACHING;BURNING;DISCOMFORT;SHOOTING

## 2024-02-01 NOTE — PROGRESS NOTES
Occupational Therapy    Treatment    Patient Name: Camilla Doty  MRN: [id number]  : 1970  Today's Date: 24     Time Calculation  Start Time: 1702  Stop Time:   Time Calculation (min): 45 min  OT Therapeutic Procedures Time Entry  Manual Therapy Time Entry: 10  Therapeutic Activity Time Entry: 15  Therapeutic Exercise Time Entry: 20    Assessment: Patient reports positioning of wrist helping with work; edema and pain with thumb spica and work positioning.     OT Assessment Results: Decreased ADL status, Decreased upper extremity range of motion, Decreased upper extremity strength, Decreased fine motor control, Decreased IADLs  Plan:  1 x week for 6 weeks, this is her 5th visit ; this is 1 week s/p cortisone injection right wrist Dr. Valerio; Emg to be scheduled per patient    Pt recommended to see primary PCP for results of blood work;        Subjective   Current Problem:  1. Right wrist pain        2. Pain, wrist  Follow Up In Occupational Therapy      3. Right wrist sprain, initial encounter  Follow Up In Occupational Therapy        General:      General  Reason for Referral: ADL impairment R dominant hand with work and IADL  Referred By: Dr. Gipson  Past Medical History Relevant to Rehab: Unknown cause of onset of pain right wrist  Preferred Learning Style: verbal, visual, written  General Comment: pt has family medical hx of RA, but patient herself does not have dx  Precautions:  Precautions Comment: pain with prefab wrist brace, unable to use, but having a little more success with pain control with thumb spica       Pain:  Pain Assessment  Pain Assessment: 0-10  Pain Score: 5 - Moderate pain  Pain Type: Chronic pain  Pain Location: Arm  Pain Orientation: Right  Pain Radiating Towards: pain between 4-5/10 since injection, all around wrist/forearm and thumb  Pain Descriptors: Aching, Burning, Discomfort, Shooting    Objective  Pain better with patient using thumb spica and the wrist rest  during work she feels she has less pain and better with sleep, but continues to report long work hours.  Pain with active use. Continue thumb spica comfort cool restriction for computer and daily use.    Modalities:  Modalities Used: Yes (Recommending patient use ice and heat for assist with pain control, education regarding care and precautions)  Modality 1: Untimed Fluidotherapy  Splinting:  Splinting Education: Precautions, Wear schedule  Splinting Comments: comfort cool thumb spica       Therapy/Activity: Therapeutic Exercise  Therapeutic Exercise Performed: Yes  Therapeutic Exercise Activity 1: AROM of wrist flexion/extension/rotation 10 reps; completed 2 x 10 reps of wrist flexion /ext and thumb index pinch with wrist flex ext, with warm up in fluidotherapy with direct therapist supervision  Therapeutic Exercise Activity 2: performed right UE AROM with pulleys 3 mins scaption  Therapeutic Exercise Activity 3: UBE completed 6 mins forward/reverse    Therapeutic Activity  Therapeutic Activity Performed: Yes  Therapeutic Activity 1: Hand helper /gripper with  rotation 20-15-10 lbs. with neutral start 25 reps (tolerated well)  Therapeutic Activity 2: completed theraputty presses for wt loading; pinching, gripping medium resistance, tolerated fair, weakness with pain reported at fingers/wrist;    Manual Therapy  Manual Therapy Performed: Yes  Manual Therapy Activity 1: STM , edema retrograde massage; passive assist with ROM; fair tolerance of light joint mob to right wrist joint/carpus glide  Sensation: intact to light touch     Strength: Current measures Current strength measurements     Right key pinch 5 lbs.; Left key pinch 11 lbs.  Right 3 jaw rosie 2 lbs.; Left 3 jaw rosie 10 lbs.       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; positive CMC OA and FCR tunnel tendinitis; positive for scapho/ligament injury; neg for scaphoid; positive for pain with palpation at distal radius;  Right wrist pain and dysfunction with inadequate orthotic support  Risk and Benefits Discussed with Patient/Caregiver/Other: yes  Patient/Caregiver Demonstrated Understanding: yes  Plan of Care Discussed and Agreed Upon: yes  Patient Response to Education: Patient/Caregiver Verbalized Understanding of Information, Patient/Caregiver Performed Return Demonstration of Exercises/Activities, Patient/Caregiver Asked Appropriate Questions    Goals:  Active       OT Problem       Patient will complete all goals as listed in evaluation, see notes and attached goals. (Progressing)       Start:  01/18/24    Expected End:  02/07/24              Active         OT Problem         Patient will complete carrying groceries with R hand without pain;         Start:  12/07/23    Expected End:  02/07/24              PATIENT WILL ACHIEVE right  STRENGTH OF 15 lbs.  IN THE two position         Start:  12/07/23    Expected End:  02/07/24              PATIENT WILL ACHIEVE right KEY and 3 jaw rosie PINCH STRENGTH OF 10 lbs.         Start:  12/07/23    Expected End:  02/07/24              PATIENT WILL ACHIEVE right WRIST EXT/FLEXION ROM 55/60  DEGREES         Start:  12/07/23    Expected End:  02/07/24              PATIENT WILL DEMONSTRATE ROM MKI 8/10 of THUMB TO 5TH DIGIT          Start:  12/07/23    Expected End:  02/07/24              PATIENT WILL SHOW A SIGNIFICANT CHANGE IN UEFI PATIENT REPORTED  OUTCOME TOOL TO DEMONSTRATE SUBJECTIVE IMPROVEMENT         Start:  12/07/23    Expected End:  02/07/24              PATIENT WILL REPORT PAIN OF 2/10 DEMONSTRATING A REDUCTION OF OVERALL PAIN (Progressing)         Start:  12/07/23    Expected End:  02/07/24              PATIENT WILL DEMONSTRATE INDEPENDENCE IN HOME PROGRAM FOR SUPPORT OF PROGRESSION (Progressing)         Start:  12/07/23    Expected End:  02/07/24              PATIENT WILL DEMONSTRATE ABILITY TO FOLLOW wrist sprain injury PRECAUTIONS INDEPENDENTLY (Progressing)         Start:  12/07/23    Expected End:  02/07/24

## 2024-02-08 ENCOUNTER — TREATMENT (OUTPATIENT)
Dept: OCCUPATIONAL THERAPY | Facility: CLINIC | Age: 54
End: 2024-02-08
Payer: COMMERCIAL

## 2024-02-08 DIAGNOSIS — M25.539 PAIN, WRIST: ICD-10-CM

## 2024-02-08 DIAGNOSIS — S63.501A RIGHT WRIST SPRAIN, INITIAL ENCOUNTER: ICD-10-CM

## 2024-02-08 DIAGNOSIS — M25.531 RIGHT WRIST PAIN: Primary | ICD-10-CM

## 2024-02-08 PROCEDURE — 97140 MANUAL THERAPY 1/> REGIONS: CPT | Mod: GO | Performed by: OCCUPATIONAL THERAPIST

## 2024-02-08 PROCEDURE — 97530 THERAPEUTIC ACTIVITIES: CPT | Mod: GO | Performed by: OCCUPATIONAL THERAPIST

## 2024-02-08 PROCEDURE — 97110 THERAPEUTIC EXERCISES: CPT | Mod: GO | Performed by: OCCUPATIONAL THERAPIST

## 2024-02-08 ASSESSMENT — PAIN SCALES - GENERAL: PAINLEVEL_OUTOF10: 4

## 2024-02-08 ASSESSMENT — PAIN - FUNCTIONAL ASSESSMENT: PAIN_FUNCTIONAL_ASSESSMENT: 0-10

## 2024-02-08 NOTE — PROGRESS NOTES
Occupational Therapy    Reassessment Treatment    Patient Name: Camilla Doty  MRN: [id number]  : 1970  Today's Date: 24     Time Calculation  Start Time: 1703  Stop Time: 1748  Time Calculation (min): 45 min  OT Therapeutic Procedures Time Entry  Manual Therapy Time Entry: 15  Therapeutic Activity Time Entry: 15  Therapeutic Exercise Time Entry: 15    Assessment:   Weakness of  pinch and wrist flex/ext; poor functional use of right hand with work and with ADL tasks; some improvement with pain control, she reports less pain right positioning of wrist helping with work; edema and pain with thumb spica and work positioning.     OT Assessment Results: Decreased ADL status, Decreased upper extremity range of motion, Decreased upper extremity strength, Decreased fine motor control, Decreased IADLs  Plan:  Plan to continue with 6 visits; 6 visits of 12 visits for right wrist pain and weakness with function.    Pt recommended to see primary PCP for results of blood work;        Subjective   Current Problem:  1. Right wrist pain        2. Pain, wrist  Follow Up In Occupational Therapy    Follow Up In Occupational Therapy      3. Right wrist sprain, initial encounter  Follow Up In Occupational Therapy    Follow Up In Occupational Therapy        General:      General  Reason for Referral: ADL impairment R dominant hand with work and IADL  Referred By: Dr. Gipson  Past Medical History Relevant to Rehab: Unknown cause of onset of pain right wrist  Preferred Learning Style: verbal, visual, written  General Comment: pt has family medical hx of RA, but patient herself does not have dx  Precautions:  Precautions Comment: pain with prefab wrist brace, unable to use, but having a little more success with pain control with thumb spica       Pain:  Pain Assessment  Pain Assessment: 0-10  Pain Score: 4  Pain Type: Chronic pain  Pain Location: Wrist  Pain Orientation: Right  Pain Radiating Towards: less pain per  patient at joint    Objective  Pain better with patient using thumb spica and the wrist rest during work she feels she has less pain and better with sleep, but continues to report long work hours.  Pain with active use. Continue thumb spica comfort cool restriction for computer and daily use.    Modalities:  Modalities Used: Yes (Recommending patient use ice and heat for assist with pain control, education regarding care and precautions)  Modality 1: Untimed Fluidotherapy  Splinting:  Splinting Education: Precautions, Wear schedule       Therapy/Activity: Therapeutic Exercise  Therapeutic Exercise Performed: Yes  Therapeutic Exercise Activity 1: AROM of wrist flexion/extension/rotation 10 reps; completed 2 x 10 reps of wrist flexion /ext and thumb index pinch with wrist flex ext, with warm up in fluidotherapy with direct therapist supervision  Therapeutic Exercise Activity 2: reviewed right UE AROM countertop and pendulums/ pulleys 3 mins scaption  Therapeutic Exercise Activity 3: hammer rotation with 2 lbs. with wrist ext/flex 15 reps.    Therapeutic Activity  Therapeutic Activity Performed: Yes  Therapeutic Activity 1: frisbee rad/ulnar deviation 10 reps cw; 10x ccw 2-3 mins supination  Therapeutic Activity 2: flexbar red with supination/pronation 10 reps each position    Manual Therapy  Manual Therapy Performed: Yes  Manual Therapy Activity 1: STM , edema retrograde massage; passive assist with ROM; fair tolerance of light joint mob to right wrist joint/carpus glide  Sensation: intact to light touch     Strength:   Wrist strength Right flexion 3/5 MMT;Left wrist extension 2+/5; supination 3/5; pronation  4/5  Current measures Current strength measurements   strength Right 10 lbs. Left  43 lbs.; (Level II 2 trials)     Right key pinch 5 lbs.; Left key pinch 11 lbs.  Right 3 jaw rosie 3 lbs.; Left 3 jaw rosie 9 lbs.       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; positive CMC OA and FCR tunnel tendinitis; positive for scapho/ligament injury; neg for scaphoid; positive for pain with palpation at distal radius;  Right wrist pain and dysfunction with inadequate orthotic support  Risk and Benefits Discussed with Patient/Caregiver/Other: yes  Patient/Caregiver Demonstrated Understanding: yes  Plan of Care Discussed and Agreed Upon: yes  Patient Response to Education: Patient/Caregiver Verbalized Understanding of Information, Patient/Caregiver Performed Return Demonstration of Exercises/Activities, Patient/Caregiver Asked Appropriate Questions    Goals:  Active       OT Problem       PATIENT WILL ACHIEVE right WRIST EXTENSION STRENGTH OF 5/5 for carrying tasks. (Progressing)       Start:  02/08/24    Expected End:  04/08/24            PATIENT WILL ACHIEVE right WRIST FLEXION STRENGTH OF 5/5 for dressing skills. (Progressing)       Start:  02/08/24    Expected End:  04/08/24            PATIENT WILL ACHIEVE right  STRENGTH OF 45 lbs. IN THE two position for holding tools and railings. (Progressing)       Start:  02/08/24    Expected End:  04/08/24            PATIENT WILL ACHIEVE right PINCH STRENGTH OF 12 lbs. for dressing skills. (Progressing)       Start:  02/08/24    Expected End:  04/08/24            PATIENT WILL SHOW A SIGNIFICANT CHANGE IN UEFI PATIENT REPORTED OUTCOME TOOL TO DEMONSTRATE SUBJECTIVE IMPROVEMENT (Progressing)       Start:  02/08/24     Expected End:  04/08/24            PATIENT WILL DEMONSTRATE INDEPENDENCE IN HOME PROGRAM FOR SUPPORT OF PROGRESSION (Progressing)       Start:  02/08/24    Expected End:  04/08/24            PATIENT WILL REPORT PAIN OF 0-1/10 DEMONSTRATING A REDUCTION OF OVERALL PAIN       Start:  02/08/24    Expected End:  04/08/24              Resolved       OT Problem       Patient will complete all goals as listed in evaluation, see notes and attached goals. (Met)       Start:  01/18/24    Expected End:  02/07/24    Resolved:  02/08/24

## 2024-02-09 ENCOUNTER — OFFICE VISIT (OUTPATIENT)
Dept: PRIMARY CARE | Facility: CLINIC | Age: 54
End: 2024-02-09
Payer: COMMERCIAL

## 2024-02-09 VITALS
DIASTOLIC BLOOD PRESSURE: 80 MMHG | WEIGHT: 272 LBS | HEART RATE: 111 BPM | SYSTOLIC BLOOD PRESSURE: 112 MMHG | TEMPERATURE: 98.7 F | OXYGEN SATURATION: 98 % | BODY MASS INDEX: 41.36 KG/M2 | RESPIRATION RATE: 18 BRPM

## 2024-02-09 DIAGNOSIS — M18.11 OSTEOARTHRITIS OF RIGHT THUMB: ICD-10-CM

## 2024-02-09 DIAGNOSIS — M25.531 CHRONIC PAIN OF RIGHT WRIST: ICD-10-CM

## 2024-02-09 DIAGNOSIS — E78.2 MIXED HYPERLIPIDEMIA: Primary | ICD-10-CM

## 2024-02-09 DIAGNOSIS — Z82.61 FAMILY HISTORY OF RHEUMATOID ARTHRITIS: ICD-10-CM

## 2024-02-09 DIAGNOSIS — R73.03 PREDIABETES: ICD-10-CM

## 2024-02-09 DIAGNOSIS — G89.29 CHRONIC PAIN OF RIGHT WRIST: ICD-10-CM

## 2024-02-09 PROCEDURE — 99213 OFFICE O/P EST LOW 20 MIN: CPT | Performed by: FAMILY MEDICINE

## 2024-02-09 PROCEDURE — 1036F TOBACCO NON-USER: CPT | Performed by: FAMILY MEDICINE

## 2024-02-09 ASSESSMENT — PATIENT HEALTH QUESTIONNAIRE - PHQ9
SUM OF ALL RESPONSES TO PHQ9 QUESTIONS 1 AND 2: 0
2. FEELING DOWN, DEPRESSED OR HOPELESS: NOT AT ALL
1. LITTLE INTEREST OR PLEASURE IN DOING THINGS: NOT AT ALL

## 2024-02-09 ASSESSMENT — ENCOUNTER SYMPTOMS
DEPRESSION: 0
LOSS OF SENSATION IN FEET: 0
OCCASIONAL FEELINGS OF UNSTEADINESS: 0

## 2024-02-09 ASSESSMENT — PAIN SCALES - GENERAL: PAINLEVEL: 3

## 2024-02-09 NOTE — PATIENT INSTRUCTIONS
Problem List Items Addressed This Visit             ICD-10-CM    Mixed hyperlipidemia - Primary E78.2    Relevant Orders    Comprehensive Metabolic Panel    Lipid Panel    Chronic pain of right wrist M25.531, G89.29    Relevant Orders    Referral to Rheumatology     Other Visit Diagnoses         Codes    Prediabetes     R73.03    Relevant Orders    Comprehensive Metabolic Panel    Lipid Panel    Family history of rheumatoid arthritis     Z82.61    Relevant Orders    Referral to Rheumatology    Osteoarthritis of right thumb     M18.11    Relevant Orders    Referral to Rheumatology            Additional Visit Plans:  Plan to focus on a low fat moderate carbohydrate diet and recheck levels to assess improvement/progression.     Plan to see rheumatology, referral placed. I am glad the steroid injection is helping. FU soon with ortho.       Patient Care Team:  Bryon Le DO as PCP - General (Family Medicine)    Bryon Le DO   02/09/24   4:33 PM

## 2024-02-09 NOTE — PROGRESS NOTES
"       Outpatient Visit Note    Chief Complaint   Patient presents with    Results     Follow up bloodwork results       HPI:  Camilla Doty is a 53 y.o. female here to follow-up on her blood work results.    Labs were last completed for a me in June 2023.  Elevated cholesterol levels with prediabetes.  Normal TSH and CBC.  ASCVD risk is 1.4%. Due for recheck on some labs.     Saw the hand specialist. Had some labs done. Xray previously done. Dr. Gipson thought it was tendonitis but this was not getting better with PT. Pain and swelling. Saw Dr. Valerio for second opinion on her right wrist 2 weeks ago. Suspects \"complex regional something.\" Also some arthritis in her thumb.    Was told she could see pain management for this.     FHx rheumatoid arthritis in father and her sister. Did some labs for this, was told levels are just below abnormal for this. CBC is normal. Elevated sed rate. Normal uric acid. C reactive protein normal. Normal rheum factor. ALYSSA neg.     Got a steroid shot into her wrist. Swelling is improved and pain is better. Flexion improved. Working on restricted extension. Dr. Valerio FU in 2 weeks.     PHQ9/GAD7:         Past Medical History:   Diagnosis Date    Intestinal ulceration     Pain in right wrist         Current Medications  Current Outpatient Medications   Medication Instructions    multivit-minerals/folic acid (MULTIVITAMIN GUMMIES ORAL) 2 tablets, oral, Daily    mv-mn/iron/folic acid/herb 190 (VITAMIN D3 COMPLETE ORAL) 1 capsule, oral, Daily        Allergies  Allergies   Allergen Reactions    Nsaids (Non-Steroidal Anti-Inflammatory Drug) GI Upset     Intestinal ulcers        Past Surgical History:   Procedure Laterality Date    KIDNEY SURGERY  1978    third kidney  removed     Family History   Problem Relation Name Age of Onset    Breast cancer Mother  43    Prostate cancer Father      Hyperlipidemia Father      Arthritis Father      Heart disease Father      Arthritis Sister   "    Cancer Father's Brother      BRCA1 Positive Father's Brother          BRCA gene mutation positive    Cancer Paternal Grandfather       Social History     Tobacco Use    Smoking status: Never     Passive exposure: Never    Smokeless tobacco: Never   Vaping Use    Vaping Use: Never used   Substance Use Topics    Alcohol use: Yes     Alcohol/week: 2.0 standard drinks of alcohol     Types: 1 Glasses of wine, 1 Shots of liquor per week    Drug use: Never     Tobacco Use: Low Risk  (2/9/2024)    Patient History     Smoking Tobacco Use: Never     Smokeless Tobacco Use: Never     Passive Exposure: Never        ROS  All pertinent positive symptoms are included in the history of present illness.  All other systems have been reviewed and are negative and noncontributory to this patient's current ailments.    VITAL SIGNS  Vitals:    02/09/24 1605   BP: 112/80   Pulse: (!) 111   Resp: 18   Temp: 37.1 °C (98.7 °F)   SpO2: 98%     Vitals:    02/09/24 1605   Weight: 123 kg (272 lb)      Body mass index is 41.36 kg/m².     PHYSICAL EXAM  GENERAL APPEARANCE: well nourished, well developed, looks like stated age, in no acute distress, not ill or tired appearing, conversing well.   HEENT: no trauma, normocephalic.   NECK: supple without rigidity, no neck mass was observed.   LUNGS: good chest wall expansion. In no acute respiratory distress.   EXTREMITIES: Wrist size is improved  SKIN: normal skin color and pigmentation, without rash.   NEUROLOGIC EXAM: CN II-XII grossly intact, normal gait.   PSYCH: mood and affect appropriate; alert and oriented to time, place, person; no difficulty with speech or language.     Assessment/Plan   Problem List Items Addressed This Visit             ICD-10-CM    Mixed hyperlipidemia - Primary E78.2    Relevant Orders    Comprehensive Metabolic Panel    Lipid Panel    Chronic pain of right wrist M25.531, G89.29    Relevant Orders    Referral to Rheumatology     Other Visit Diagnoses         Codes     Prediabetes     R73.03    Relevant Orders    Comprehensive Metabolic Panel    Lipid Panel    Family history of rheumatoid arthritis     Z82.61    Relevant Orders    Referral to Rheumatology    Osteoarthritis of right thumb     M18.11    Relevant Orders    Referral to Rheumatology            Additional Visit Plans:  Plan to focus on a low fat moderate carbohydrate diet and recheck levels to assess improvement/progression.     Plan to see rheumatology, referral placed. I am glad the steroid injection is helping. FU soon with ortho.       Patient Care Team:  Bryon Le DO as PCP - General (Family Medicine)    Bryon Le DO   02/13/24   6:51 AM

## 2024-02-29 ENCOUNTER — TREATMENT (OUTPATIENT)
Dept: OCCUPATIONAL THERAPY | Facility: CLINIC | Age: 54
End: 2024-02-29
Payer: COMMERCIAL

## 2024-02-29 DIAGNOSIS — S63.501A RIGHT WRIST SPRAIN, INITIAL ENCOUNTER: ICD-10-CM

## 2024-02-29 DIAGNOSIS — G89.29 CHRONIC PAIN OF RIGHT WRIST: Primary | ICD-10-CM

## 2024-02-29 DIAGNOSIS — M25.539 PAIN, WRIST: ICD-10-CM

## 2024-02-29 DIAGNOSIS — M25.531 CHRONIC PAIN OF RIGHT WRIST: Primary | ICD-10-CM

## 2024-02-29 PROCEDURE — 97110 THERAPEUTIC EXERCISES: CPT | Mod: GO | Performed by: OCCUPATIONAL THERAPIST

## 2024-02-29 PROCEDURE — 97530 THERAPEUTIC ACTIVITIES: CPT | Mod: GO | Performed by: OCCUPATIONAL THERAPIST

## 2024-02-29 PROCEDURE — 97140 MANUAL THERAPY 1/> REGIONS: CPT | Mod: GO | Performed by: OCCUPATIONAL THERAPIST

## 2024-02-29 ASSESSMENT — PAIN - FUNCTIONAL ASSESSMENT: PAIN_FUNCTIONAL_ASSESSMENT: 0-10

## 2024-02-29 ASSESSMENT — PAIN DESCRIPTION - DESCRIPTORS: DESCRIPTORS: ACHING;DISCOMFORT

## 2024-02-29 ASSESSMENT — PAIN SCALES - GENERAL: PAINLEVEL_OUTOF10: 3

## 2024-02-29 NOTE — PROGRESS NOTES
Occupational Therapy    Reassessment Treatment    Patient Name: Camilla Doty  MRN: [id number]  : 1970  Today's Date: 24   Time:  Time Calculation  Start Time: 1703  Stop Time:   Time Calculation (min): 44 min  OT Therapeutic Procedures Time Entry  Manual Therapy Time Entry: 14  Therapeutic Activity Time Entry: 15  Therapeutic Exercise Time Entry: 15    Insurance:  Visit number: 7 of 12  Authorization info: [employer]  Insurance Type: Payor: GAB / Plan: [employer] HEALTH PLAN / Product Type: *No Product type* /    Assessment:   Weakness of  pinch and wrist flexion improving with slight increase of wrist extension; functional use of right hand with work and with ADL tasks; pt indep with pain control, she reports less pain right positioning of wrist helping with work; edema and pain with thumb spica and work positioning.     OT Assessment Results: Decreased ADL status, Decreased upper extremity range of motion, Decreased upper extremity strength, Decreased fine motor control, Decreased IADLs  Plan:  Plan to continue with 7 visits of 12 visits for right wrist pain and weakness with function.    Pt recommended to see primary PCP for results of blood work;        Subjective   Current Problem:  1. Chronic pain of right wrist        2. Pain, wrist  Follow Up In Occupational Therapy      3. Right wrist sprain, initial encounter  Follow Up In Occupational Therapy        General:      General  Reason for Referral: ADL impairment R dominant hand with work and IADL  Referred By: Dr. Gipson  Past Medical History Relevant to Rehab: Unknown cause of onset of pain right wrist  Preferred Learning Style: verbal, visual, written  General Comment: pt has rheumatology appt, she has family medical hx of RA,  Precautions:  Precautions Comment: pain with prefab wrist brace, unable to use, but having a little more success with pain control with thumb spica       Pain:  Pain Assessment  Pain Assessment: 0-10  Pain Score:  3  Pain Type: Chronic pain  Pain Location: Wrist  Pain Orientation: Right  Pain Descriptors: Aching, Discomfort    Objective  Pain better with patient using thumb spica and the wrist rest during work she feels she has less pain and better with sleep, but continues to report long work hours.  Pain with active use. Continue thumb spica comfort cool restriction for computer and daily use.    Modalities:  Modalities Used: Yes (Recommending patient use ice and heat for assist with pain control, education regarding care and precautions)  Modality 1: Untimed Fluidotherapy  Splinting:  Splinting Education: Precautions, Wear schedule       Therapy/Activity: Therapeutic Exercise  Therapeutic Exercise Performed: Yes  Therapeutic Exercise Activity 1: AROM of wrist flexion/extension/rotation 10 reps; completed 2 x 10 reps of wrist flexion /ext and thumb index pinch with wrist flex ext, with warm up in fluidotherapy with direct therapist supervision  Therapeutic Exercise Activity 2: reviewed right UE AROM countertop and pendulums/ pulleys 3 mins scaption  Therapeutic Exercise Activity 3: hammer rotation with 2 lbs. with wrist ext/flex 15 reps.    Therapeutic Activity  Therapeutic Activity Performed: Yes  Therapeutic Activity 1: T-putty with presses, flattening standing and seated 2-3 minutes, taffy pull, twist  Therapeutic Activity 2: flexbar red with supination/pronation 10 reps each position    Manual Therapy  Manual Therapy Performed: Yes  Manual Therapy Activity 1: STM , edema retrograde massage; passive assist with ROM; fair tolerance of light joint mob to right wrist joint/carpus glide  Sensation: intact to light touch  AROM of right wrist et/flex 25/45 degrees;      Strength:   Wrist strength Right flexion 3+/5 MMT; wrist extension 2+/5; supination 4/5; pronation  4/5  Current measures Current strength measurements   strength Right 10 lbs. Left  43 lbs.; (Level II 2 trials)     Right key pinch 5 lbs.; Left key  pinch 11 lbs.  Right 3 jaw rosie 3 lbs.; Left 3 jaw rosie 9 lbs.       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; positive CMC OA and FCR tunnel tendinitis; positive for scapho/ligament injury; neg for scaphoid; positive for pain with palpation at distal radius;  Right wrist pain and dysfunction with inadequate orthotic support  Risk and Benefits Discussed with Patient/Caregiver/Other: yes  Patient/Caregiver Demonstrated Understanding: yes  Plan of Care Discussed and Agreed Upon: yes  Patient Response to Education: Patient/Caregiver Verbalized Understanding of Information, Patient/Caregiver Performed Return Demonstration of Exercises/Activities, Patient/Caregiver Asked Appropriate Questions    Goals:  Active       OT Problem       PATIENT WILL ACHIEVE right WRIST EXTENSION STRENGTH OF 5/5 for carrying tasks. (Progressing)       Start:  02/08/24    Expected End:  04/08/24            PATIENT WILL ACHIEVE right WRIST FLEXION STRENGTH OF 5/5 for dressing skills. (Progressing)       Start:  02/08/24    Expected End:  04/08/24            PATIENT WILL ACHIEVE right  STRENGTH OF 45 lbs. IN THE two position for holding tools and railings. (Progressing)       Start:  02/08/24    Expected End:  04/08/24            PATIENT WILL ACHIEVE right PINCH STRENGTH OF 12 lbs. for dressing skills. (Progressing)       Start:  02/08/24    Expected End:  04/08/24             PATIENT WILL SHOW A SIGNIFICANT CHANGE IN UEFI PATIENT REPORTED OUTCOME TOOL TO DEMONSTRATE SUBJECTIVE IMPROVEMENT (Progressing)       Start:  02/08/24    Expected End:  04/08/24            PATIENT WILL DEMONSTRATE INDEPENDENCE IN HOME PROGRAM FOR SUPPORT OF PROGRESSION (Progressing)       Start:  02/08/24    Expected End:  04/08/24            PATIENT WILL REPORT PAIN OF 0-1/10 DEMONSTRATING A REDUCTION OF OVERALL PAIN (Progressing)       Start:  02/08/24    Expected End:  04/08/24              Resolved       OT Problem       Patient will complete all goals as listed in evaluation, see notes and attached goals. (Met)       Start:  01/18/24    Expected End:  02/07/24    Resolved:  02/08/24

## 2024-03-07 ENCOUNTER — TREATMENT (OUTPATIENT)
Dept: OCCUPATIONAL THERAPY | Facility: CLINIC | Age: 54
End: 2024-03-07
Payer: COMMERCIAL

## 2024-03-07 DIAGNOSIS — S63.501A RIGHT WRIST SPRAIN, INITIAL ENCOUNTER: ICD-10-CM

## 2024-03-07 DIAGNOSIS — M25.539 PAIN, WRIST: ICD-10-CM

## 2024-03-07 PROCEDURE — 97110 THERAPEUTIC EXERCISES: CPT | Mod: GO | Performed by: OCCUPATIONAL THERAPIST

## 2024-03-07 PROCEDURE — 97140 MANUAL THERAPY 1/> REGIONS: CPT | Mod: GO | Performed by: OCCUPATIONAL THERAPIST

## 2024-03-07 PROCEDURE — 97530 THERAPEUTIC ACTIVITIES: CPT | Mod: GO | Performed by: OCCUPATIONAL THERAPIST

## 2024-03-07 ASSESSMENT — PAIN DESCRIPTION - DESCRIPTORS: DESCRIPTORS: ACHING;DISCOMFORT

## 2024-03-07 ASSESSMENT — PAIN - FUNCTIONAL ASSESSMENT: PAIN_FUNCTIONAL_ASSESSMENT: 0-10

## 2024-03-07 ASSESSMENT — PAIN SCALES - GENERAL: PAINLEVEL_OUTOF10: 4

## 2024-03-07 NOTE — PROGRESS NOTES
Occupational Therapy     Treatment    Patient Name: Camilla Doty  MRN: [id number]  : 1970  Today's Date: 24   Time:  Time Calculation  Start Time: 1703  Stop Time: 1748  Time Calculation (min): 45 min  OT Therapeutic Procedures Time Entry  Manual Therapy Time Entry: 15  Therapeutic Activity Time Entry: 15  Therapeutic Exercise Time Entry: 15    Insurance:  Visit number:  8 of 12  Authorization info: [employer]  Insurance Type: Payor: GAB / Plan: [employer] HEALTH PLAN / Product Type: *No Product type* /    Assessment:   Some improvement of her right  and pinch and wrist with some improvement of her wrist extension; continuing with daily desk work; focus on reducing edema and pain with thumb spica and work positioning.     OT Assessment Results: Decreased ADL status, Decreased upper extremity range of motion, Decreased upper extremity strength, Decreased fine motor control, Decreased IADLs  Plan:  Plan to continue with 8 visits of 12 visits for right wrist pain and weakness with function.    Pt recommended to see primary PCP for results of blood work;        Subjective   Current Problem:  1. Pain, wrist  Follow Up In Occupational Therapy      2. Right wrist sprain, initial encounter  Follow Up In Occupational Therapy        General:      General  Reason for Referral: ADL impairment R dominant hand with work and IADL  Referred By: Dr. Gipson  Past Medical History Relevant to Rehab: Unknown cause of onset of pain right wrist  Preferred Learning Style: verbal, visual, written  General Comment: pt has rheumatology appt, she has family medical hx of RA,  Precautions:  Precautions Comment: pain with prefab wrist brace, unable to use, but having a little more success with pain control with thumb spica       Pain:  Pain Assessment  Pain Assessment: 0-10  Pain Score: 4  Pain Location: Wrist  Pain Orientation: Right  Pain Descriptors: Aching, Discomfort    Objective  Pain better with patient using thumb spica  and the wrist rest during work she feels she has less pain and better with sleep, but continues to report long work hours.  Pain with active use. Continue thumb spica comfort cool restriction for computer and daily use.    Modalities:  Modalities Used: Yes (Recommending patient use ice and heat for assist with pain control, education regarding care and precautions)  Modality 1: Untimed Fluidotherapy  Splinting:  Splinting Education: Precautions, Wear schedule       Therapy/Activity: Therapeutic Exercise  Therapeutic Exercise Performed: Yes  Therapeutic Exercise Activity 1: AROM of wrist flexion/extension/rotation 10 reps; completed 2 x 10 reps of wrist flexion /ext and thumb index pinch with wrist flex ext, with warm up in fluidotherapy with direct therapist supervision  Therapeutic Exercise Activity 2: Performed theraband peach light with wrist flexion 10x ; wrist extension 10 x with endrange holds;    Therapeutic Activity  Therapeutic Activity Performed: Yes  Therapeutic Activity 1: Theraputty with wrist rad/uln dev cup and cap with pinch/ opening jars simulating practice;  Therapeutic Activity 2: Red flexbar pronation/supination 10 reps;    Manual Therapy  Manual Therapy Performed: Yes  Manual Therapy Activity 1: STM , edema retrograde massage; passive assist with ROM; fair tolerance of light joint mob to right wrist joint/carpus glide  Sensation: intact to light touch  AROM of right wrist et/flex 30/45 degrees;      Strength:   Wrist strength Right flexion 3+/5 MMT; wrist extension 2+/5; supination 4/5; pronation  4/5  Current measures :   strength Right 10 lbs. Left  43 lbs.; (Level II 2 trials)     Right key pinch 5 lbs.; Left key pinch 11 lbs.  Right 3 jaw rosie 3 lbs.; Left 3 jaw rosie 9 lbs.       Hand Function: right hand impaired     Outcome Measures:  UEFI score 23/80 see for details      OP EDUCATION:  Education  Individual(s) Educated: Patient  Education Provided: Diagnosis & Precautions,  Symptom management, Joint protection and energy conservation, Orthotics wearing schedule and precautions, Risk and benefits of OT discussed with patient or other, POC discussed and agreed upon  Home Program: AROM, Activity modification, Modalities, Orthotic wearing schedule, care and precautions, Handout issued  Diagnosis and Precautions: Positive for intersection syndrome with palpation/negative for Dequervain's; positive CMC OA and FCR tunnel tendinitis; positive for scapho/ligament injury; neg for scaphoid; positive for pain with palpation at distal radius;  Right wrist pain and dysfunction with inadequate orthotic support  Risk and Benefits Discussed with Patient/Caregiver/Other: yes  Patient/Caregiver Demonstrated Understanding: yes  Plan of Care Discussed and Agreed Upon: yes  Patient Response to Education: Patient/Caregiver Verbalized Understanding of Information, Patient/Caregiver Performed Return Demonstration of Exercises/Activities, Patient/Caregiver Asked Appropriate Questions    Goals:  Active       OT Problem       PATIENT WILL ACHIEVE right WRIST EXTENSION STRENGTH OF 5/5 for carrying tasks. (Progressing)       Start:  02/08/24    Expected End:  04/08/24            PATIENT WILL ACHIEVE right WRIST FLEXION STRENGTH OF 5/5 for dressing skills. (Progressing)       Start:  02/08/24    Expected End:  04/08/24            PATIENT WILL ACHIEVE right  STRENGTH OF 45 lbs. IN THE two position for holding tools and railings. (Progressing)       Start:  02/08/24    Expected End:  04/08/24            PATIENT WILL ACHIEVE right PINCH STRENGTH OF 12 lbs. for dressing skills. (Progressing)       Start:  02/08/24    Expected End:  04/08/24            PATIENT WILL SHOW A SIGNIFICANT CHANGE IN UEFI PATIENT REPORTED OUTCOME TOOL TO DEMONSTRATE SUBJECTIVE IMPROVEMENT (Progressing)       Start:  02/08/24    Expected End:  04/08/24            PATIENT WILL DEMONSTRATE INDEPENDENCE IN HOME PROGRAM FOR SUPPORT OF  PROGRESSION (Progressing)       Start:  02/08/24    Expected End:  04/08/24            PATIENT WILL REPORT PAIN OF 0-1/10 DEMONSTRATING A REDUCTION OF OVERALL PAIN (Progressing)       Start:  02/08/24    Expected End:  04/08/24              Resolved       OT Problem       Patient will complete all goals as listed in evaluation, see notes and attached goals. (Met)       Start:  01/18/24    Expected End:  02/07/24    Resolved:  02/08/24

## 2024-03-14 ENCOUNTER — APPOINTMENT (OUTPATIENT)
Dept: OCCUPATIONAL THERAPY | Facility: CLINIC | Age: 54
End: 2024-03-14
Payer: COMMERCIAL

## 2024-04-05 ENCOUNTER — HOSPITAL ENCOUNTER (OUTPATIENT)
Dept: RADIOLOGY | Facility: HOSPITAL | Age: 54
Discharge: HOME | End: 2024-04-05
Payer: COMMERCIAL

## 2024-04-05 DIAGNOSIS — S63.501A UNSPECIFIED SPRAIN OF RIGHT WRIST, INITIAL ENCOUNTER: ICD-10-CM

## 2024-04-05 PROCEDURE — 73221 MRI JOINT UPR EXTREM W/O DYE: CPT | Mod: RT

## 2024-04-05 PROCEDURE — 73221 MRI JOINT UPR EXTREM W/O DYE: CPT | Mod: RIGHT SIDE | Performed by: STUDENT IN AN ORGANIZED HEALTH CARE EDUCATION/TRAINING PROGRAM

## 2024-11-18 ENCOUNTER — TELEPHONE (OUTPATIENT)
Dept: OBSTETRICS AND GYNECOLOGY | Facility: CLINIC | Age: 54
End: 2024-11-18
Payer: COMMERCIAL

## 2024-11-18 DIAGNOSIS — Z12.31 OTHER SCREENING MAMMOGRAM: Primary | ICD-10-CM

## 2024-12-20 ENCOUNTER — HOSPITAL ENCOUNTER (OUTPATIENT)
Dept: RADIOLOGY | Facility: HOSPITAL | Age: 54
Discharge: HOME | End: 2024-12-20
Payer: COMMERCIAL

## 2024-12-20 DIAGNOSIS — Z12.31 OTHER SCREENING MAMMOGRAM: ICD-10-CM

## 2024-12-20 PROCEDURE — 77067 SCR MAMMO BI INCL CAD: CPT

## 2025-01-03 ENCOUNTER — HOSPITAL ENCOUNTER (OUTPATIENT)
Dept: RADIOLOGY | Facility: CLINIC | Age: 55
Discharge: HOME | End: 2025-01-03
Payer: COMMERCIAL

## 2025-01-03 DIAGNOSIS — R92.8 OTHER ABNORMAL AND INCONCLUSIVE FINDINGS ON DIAGNOSTIC IMAGING OF BREAST: ICD-10-CM

## 2025-01-03 PROCEDURE — 76642 ULTRASOUND BREAST LIMITED: CPT | Mod: RT

## 2025-01-03 PROCEDURE — 77065 DX MAMMO INCL CAD UNI: CPT | Mod: RT

## 2025-01-03 PROCEDURE — 76982 USE 1ST TARGET LESION: CPT | Mod: 50,RT

## 2025-01-07 ENCOUNTER — APPOINTMENT (OUTPATIENT)
Dept: SURGICAL ONCOLOGY | Facility: CLINIC | Age: 55
End: 2025-01-07
Payer: COMMERCIAL

## 2025-01-17 ENCOUNTER — OFFICE VISIT (OUTPATIENT)
Dept: HEMATOLOGY/ONCOLOGY | Facility: CLINIC | Age: 55
End: 2025-01-17
Payer: COMMERCIAL

## 2025-01-17 VITALS
SYSTOLIC BLOOD PRESSURE: 125 MMHG | BODY MASS INDEX: 41.83 KG/M2 | DIASTOLIC BLOOD PRESSURE: 85 MMHG | WEIGHT: 275.13 LBS | HEART RATE: 87 BPM | TEMPERATURE: 98.4 F | OXYGEN SATURATION: 96 %

## 2025-01-17 DIAGNOSIS — R92.8 ABNORMALITY OF LEFT BREAST ON SCREENING MAMMOGRAM: ICD-10-CM

## 2025-01-17 DIAGNOSIS — Z01.818 PREPROCEDURAL EXAMINATION: Primary | ICD-10-CM

## 2025-01-17 PROCEDURE — 99203 OFFICE O/P NEW LOW 30 MIN: CPT | Performed by: NURSE PRACTITIONER

## 2025-01-17 PROCEDURE — 99213 OFFICE O/P EST LOW 20 MIN: CPT | Performed by: NURSE PRACTITIONER

## 2025-01-17 RX ORDER — PEDIATRIC MULTIVITAMIN NO.238
TABLET,CHEWABLE ORAL
COMMUNITY
Start: 2024-09-30

## 2025-01-17 ASSESSMENT — PAIN SCALES - GENERAL: PAINLEVEL_OUTOF10: 0-NO PAIN

## 2025-01-17 NOTE — PROGRESS NOTES
Camilla Doty female   1970 54 y.o.   [id number]      Chief Complaint    New Patient Visit; Biopsy Consultation          HPI  Camilla Doty is a 54 y.o.  female referred by breast radiology [facility] for a pre-biopsy history & physical.      BREAST [facility]AG[facility]:     Time Phone Pager    Staci Sanabria MD 1/03/2025 15:55 [phone]     Exam Information    Status Exam Begun Exam Ended   Final 1/03/2025 15:01 1/03/2025 15:36     Study Result    Narrative & Impression   Interpreted By:  Staci Sanabria,   STUDY:  BI MAMMO RIGHT DIAGNOSTIC TOMOSYNTHESIS; BI US BREAST LIMITED RIGHT;  1/3/2025 2:53 pm; 1/3/2025 3:36 pm      ACCESSION NUMBER(S):  [id number]; [id number]      ORDERING CLINICIAN:  ESAU DONG      INDICATION:  Patient recalled from screening mammography dated 12/20/2024 for  right breast architectural distortion and prominent right axillary  lymph nodes.      ,R92.8 Other abnormal and inconclusive findings on diagnostic imaging  of breast      COMPARISON:  12/20/2024, 12/02/2023, 11/09/2022, 09/29/2020      FINDINGS:  MAMMOGRAPHY: 2D and tomosynthesis images were reviewed at 1 mm slice  thickness.      Density:  The breasts are heterogeneously dense, which may obscure  small masses.      There is persistent architectural distortion slightly superiorly at  posterior depth on the right MLO view. The architectural distortion  is not well visualized on the craniocaudal images. Ultrasound will be  performed for further evaluation. Enlarged right axillary lymph nodes  are present. No calcifications are identified.      ULTRASOUND: Targeted ultrasound was performed with elastography.  There are 13 right breast cysts. The largest is in the subareolar  aspect and measures 4.2 x 2 x 5.1 cm. There is no sonographic  correlate for the architectural distortion. Targeted ultrasound of  the right axilla was performed. The lymph node labeled 1 demonstrates  a thickened cortex measuring 1.1  cm. The lymph node labeled 2  demonstrates an irregular cortex measuring 0.5 cm. The lymph node  labeled 3 demonstrates a thickened cortex measuring 0.6 cm.      IMPRESSION:  1. Architectural distortion in the right breast seen on the ML and  MLO views. A stereotactic biopsy is recommended.  2. Indeterminate right axillary lymph nodes. An ultrasound-guided  core biopsy of the lymph node labeled 1 is recommended.      This was discussed with the patient at the time of her visit with Dr. Sanabria. A preprocedure form has been completed.      Method of Detection: Category Sdbt - 3D Screening      BI-RADS CATEGORY:  BI-RADS Category:  4 Suspicious.  Recommendation:  Surgical Consultation and Biopsy.  Recommended Date:  Immediate.  Laterality:  Right.       REPRODUCTIVE HISTORY: menarche age 16 years  menopause age N/A surgical fatty/scattered/heterogeneously/extremely dense breast tissue     FAMILY CANCER HISTORY:     Paternal grandmother - colon cancer  Father - prostate cancer  Maternal grandmother - breast cancer  Sister - breaset cancer  Paternal uncle - multiple cancer, BRCA     Family History   Problem Relation Name Age of Onset    Breast cancer Mother  43    Prostate cancer Father      Hyperlipidemia Father      Arthritis Father      Heart disease Father      Arthritis Sister      Cancer Father's Brother      BRCA1 Positive Father's Brother          BRCA gene mutation positive    Cancer Paternal Grandfather         MEDICAL HISTORY:    Rheumatoid arthritis  BRCA negative  Past Medical History:   Diagnosis Date    Intestinal ulceration     Pain in right wrist        SURGICAL HISTORY:      Past Surgical History:   Procedure Laterality Date    KIDNEY SURGERY  1978    third kidney  removed   Tubes placed in ears  Tonsillectomy        REVIEW OF SYSTEMS:  Camilla denies any unusual headaches, balance issues, depression, cough, shortness of breath, problems swallowing, changes in chest/breast area, abdominal pain,  bone or muscle pain, vaginal bleeding, rectal bleeding, blood in the urine, vaginal dryness, swelling arms or legs, new or unusual skin moles or lesions.      MEDICATIONS  Current Outpatient Medications   Medication Instructions    folic acid (Folvite) 0.2 MG solution     methotrexate 2 mg/mL solution     multivit with min-folic acid 200 mcg tablet,chewable     multivit-minerals/folic acid (MULTIVITAMIN GUMMIES ORAL) 2 tablets, oral, Daily    mv-mn/iron/folic acid/herb 190 (VITAMIN D3 COMPLETE ORAL) 1 capsule, oral, Daily        ALLERGIES  Allergies   Allergen Reactions    Nsaids (Non-Steroidal Anti-Inflammatory Drug) GI Upset     Intestinal ulcers      Patient Active Problem List    Diagnosis Date Noted    Preprocedural examination 01/17/2025    Abnormality of left breast on screening mammogram 01/17/2025    Chronic pain of right wrist 11/09/2023    Right wrist sprain, initial encounter 11/09/2023    Thumb tendonitis 11/09/2023    Mixed hyperlipidemia 11/02/2023    Obstructive sleep apnea syndrome 11/02/2023        SOCIAL HISTORY    Social History     Tobacco Use    Smoking status: Never     Passive exposure: Never    Smokeless tobacco: Never   Substance Use Topics    Alcohol use: Yes     Alcohol/week: 2.0 standard drinks of alcohol     Types: 1 Glasses of wine, 1 Shots of liquor per week        VITALS  Vitals:    01/17/25 1126   BP: 125/85   Pulse: 87   Temp: 36.9 °C (98.4 °F)   SpO2: 96%        PHYSICAL EXAM  Constitutional: Well developed, alert/oriented x3, no distress, cooperative   Eyes: clear sclera   ENMT: mucous membranes moist, no apparent lesions   Head/Neck: Neck supple, no bruits   Respiratory/Thorax: Patent airways, normal breath sounds with good chest expansion   Cardiovascular: Regular rate and rhythm, no murmurs, 2+ equal pulses of the extremities,   Gastrointestinal: Nondistended, soft, non-tender, no masses palpable, no organomegaly   Musculoskeletal: ROM intact, no joint swelling, normal  strength   Extremities: normal extremities, no edema, cyanosis, contusions or wounds   Neurological: alert and oriented x3,  normal strength   Breast: right breast without masses, nodules, skin changes. Left breast without masses, nodules, skin changes, discharge.    Lymphatic: There is a right palpable node in the center of the right axilla that is firm and fixed   Psychological: Appropriate mood and behavior   Skin: Warm and dry, no lesions, no rashes     ASSESSMENT/PLAN  Camilla is a 53 yo woman who presents for a pre-biopsy history and physical for a architectural distortion and right enlarged lymph nodes originally found on screening mammogram.     Plan:  May proceed with biopsy.  Results take about 5-10 days.  Camilla can choose to look at the results or wait for my call later in the day.  She will call with any question/concerns.  Biopsy hand out and my contact information give to Camilla.      1. Preprocedural examination        2. Abnormality of left breast on screening mammogram              No follow-ups on file.      Ly Barfield, ABEL-CNP  [facility]

## 2025-01-17 NOTE — PATIENT INSTRUCTIONS
Please call us at [phone] option 5 then option 2 with any questions or concerns.    Thank you for coming to see me today at [facility]. You are going to or have already been scheduled your biopsy of the 1/20/2025.  Our breast radiologist with assistance from the breast radiology team will be performing the biopsy.    After your biopsy, you may feel some mild discomfort and/or bruising around the biopsy site. This will gradually resolve over the next couple days to weeks.  If there is any bleeding, please hold firm pressure over the area for 30 minutes. If it does not stop, then please go to the closest emergency room. This is a very rare occurrence though it is important that you are aware.    The results of the biopsy can take about 5-10 business days. Our office will reach out to you to review the results with you over the phone.    Should you have any questions or concerns after biopsy, please call the office at [phone].    IMPORTANT INFORMATION REGARDING YOUR RESULTS    If you receive medical information from My [facility] Personal Health Record (online chart) your results will be released into your chart. This means you may view or see results of your biopsy or procedure before we are able to contact you directly. You will be called at our first opportunity to discuss your results.

## 2025-01-20 ENCOUNTER — HOSPITAL ENCOUNTER (OUTPATIENT)
Dept: RADIOLOGY | Facility: CLINIC | Age: 55
Discharge: HOME | End: 2025-01-20
Payer: COMMERCIAL

## 2025-01-20 DIAGNOSIS — R92.8 ABNORMAL FINDING ON BREAST IMAGING: Primary | ICD-10-CM

## 2025-01-20 DIAGNOSIS — R92.8 OTHER ABNORMAL AND INCONCLUSIVE FINDINGS ON DIAGNOSTIC IMAGING OF BREAST: ICD-10-CM

## 2025-01-20 DIAGNOSIS — N63.10 BREAST MASS, RIGHT: ICD-10-CM

## 2025-01-20 PROCEDURE — 2500000004 HC RX 250 GENERAL PHARMACY W/ HCPCS (ALT 636 FOR OP/ED): Performed by: STUDENT IN AN ORGANIZED HEALTH CARE EDUCATION/TRAINING PROGRAM

## 2025-01-20 PROCEDURE — 19083 BX BREAST 1ST LESION US IMAG: CPT | Performed by: STUDENT IN AN ORGANIZED HEALTH CARE EDUCATION/TRAINING PROGRAM

## 2025-01-20 PROCEDURE — 88305 TISSUE EXAM BY PATHOLOGIST: CPT | Mod: TC,AHULAB | Performed by: NURSE PRACTITIONER

## 2025-01-20 PROCEDURE — 10035 PLMT SFT TISS LOCLZJ DEV 1ST: CPT | Performed by: STUDENT IN AN ORGANIZED HEALTH CARE EDUCATION/TRAINING PROGRAM

## 2025-01-20 PROCEDURE — 19081 BX BREAST 1ST LESION STRTCTC: CPT | Mod: RT

## 2025-01-20 PROCEDURE — 77065 DX MAMMO INCL CAD UNI: CPT | Performed by: STUDENT IN AN ORGANIZED HEALTH CARE EDUCATION/TRAINING PROGRAM

## 2025-01-20 PROCEDURE — A4648 IMPLANTABLE TISSUE MARKER: HCPCS

## 2025-01-20 PROCEDURE — 38505 NEEDLE BIOPSY LYMPH NODES: CPT

## 2025-01-20 PROCEDURE — 2720000007 HC OR 272 NO HCPCS

## 2025-01-20 PROCEDURE — 2780000003 HC OR 278 NO HCPCS

## 2025-01-20 PROCEDURE — C1819 TISSUE LOCALIZATION-EXCISION: HCPCS

## 2025-01-20 PROCEDURE — 38505 NEEDLE BIOPSY LYMPH NODES: CPT | Performed by: STUDENT IN AN ORGANIZED HEALTH CARE EDUCATION/TRAINING PROGRAM

## 2025-01-20 RX ADMIN — Medication 20 ML: at 10:00

## 2025-01-20 ASSESSMENT — PAIN SCALES - GENERAL
PAINLEVEL_OUTOF10: 0 - NO PAIN
PAINLEVEL_OUTOF10: 4
PAINLEVEL_OUTOF10: 5 - MODERATE PAIN
PAINLEVEL_OUTOF10: 4

## 2025-01-20 ASSESSMENT — PAIN - FUNCTIONAL ASSESSMENT: PAIN_FUNCTIONAL_ASSESSMENT: 0-10

## 2025-01-20 NOTE — DISCHARGE INSTRUCTIONS
0945: Procedural steps explained and patient given opportunity to verbalize concerns and seek clarification.  Post procedure self-care and potential for bruising , hematoma, and pain reviewed.  Patient verbalizes understanding.      0945: Patient offered aromatherapy, warm blankets and music.   Guided imagery, touch and relaxation breathing to be used throughout the procedure.        1115:  AFTER THE TEST  A steri-strip and bandage will be placed over the incision. You may shower after 24 hours. Remove bandage after 24 hours. Remove bandage after the shower. Leave the steri-strips in place to fall off on their own. If after 1 week the steri-strips are still on, you may remove them. Avoid swimming or soaking in tub for 3 days.     You may have mild discomfort at the test site. If needed, you may take Tylenol (Acetaminophen) for pain. Please avoid taking NSAIDs, Motrin, Advil, Aleve, or ibuprofen for 24 hours following the biopsy. After 24 hours you may resume NSAIDSs.     If you take aspirin, Plavix, Coumadin, Xarelto or Eliquis please tell us. If these medications were stopped by your provider, please ask them when to resume.     You may have some tenderness, bruising or slight bleeding at the site. Please apply ice packs to the site for 15 minutes on and 15 minutes off for a 2 hour minimum.     Most people can return to their usual routine after the procedure. Avoid Strenuous activity for 24 hours.     Sleep in a bra the night after your biopsy. Continue to do so for comfort.     Call your provider if you have any of the following symptoms :  Fever  Increased pain  Increased bleeding  Redness  Increased swelling  Yellowish drainage  Your provider will get the biopsy results within 5 - 7 days. Call your provider with any questions.     Patient education brochure and pain/comfort measures have been reviewed.   Phone number provided to contact [facility] if problems arise.     Patient verbalized understanding of  home going instructions.      1127:  Patient discharged ambulatory.

## 2025-01-20 NOTE — Clinical Note
Ultrasound guided biopsy complete. Pressure held x 10 minutes, incision dry, steri strips intact and compression dressing applied. Ice pack applied.

## 2025-01-23 LAB
LAB AP ASR DISCLAIMER: NORMAL
LAB AP ASR DISCLAIMER: NORMAL
LABORATORY COMMENT REPORT: NORMAL
LABORATORY COMMENT REPORT: NORMAL
PATH REPORT.COMMENTS IMP SPEC: NORMAL
PATH REPORT.COMMENTS IMP SPEC: NORMAL
PATH REPORT.FINAL DX SPEC: NORMAL
PATH REPORT.FINAL DX SPEC: NORMAL
PATH REPORT.GROSS SPEC: NORMAL
PATH REPORT.GROSS SPEC: NORMAL
PATH REPORT.RELEVANT HX SPEC: NORMAL
PATH REPORT.RELEVANT HX SPEC: NORMAL
PATH REPORT.TOTAL CANCER: NORMAL
PATH REPORT.TOTAL CANCER: NORMAL

## 2025-01-24 ENCOUNTER — TELEPHONE (OUTPATIENT)
Dept: HEMATOLOGY/ONCOLOGY | Facility: CLINIC | Age: 55
End: 2025-01-24
Payer: COMMERCIAL

## 2025-01-24 DIAGNOSIS — R92.8 ABNORMAL SCREENING MAMMOGRAM: Primary | ICD-10-CM

## 2025-01-24 NOTE — TELEPHONE ENCOUNTER
Left message on personal voicemail that biopsy is negative and is concordant. A recommendation is for a 6 month follow up mammogram. I will place that order and a  will call her with that appointment. She is welcome to call me if she has any questions. Glenys

## 2025-02-14 ENCOUNTER — APPOINTMENT (OUTPATIENT)
Dept: PRIMARY CARE | Facility: CLINIC | Age: 55
End: 2025-02-14
Payer: COMMERCIAL

## 2025-02-27 ENCOUNTER — APPOINTMENT (OUTPATIENT)
Dept: PRIMARY CARE | Facility: CLINIC | Age: 55
End: 2025-02-27
Payer: COMMERCIAL

## 2025-02-27 ASSESSMENT — PROMIS GLOBAL HEALTH SCALE
RATE_AVERAGE_FATIGUE: MODERATE
RATE_GENERAL_HEALTH: GOOD
RATE_SOCIAL_SATISFACTION: VERY GOOD
RATE_QUALITY_OF_LIFE: GOOD
RATE_PHYSICAL_HEALTH: GOOD
RATE_AVERAGE_PAIN: 8
EMOTIONAL_PROBLEMS: NEVER
CARRYOUT_SOCIAL_ACTIVITIES: VERY GOOD
RATE_MENTAL_HEALTH: VERY GOOD
CARRYOUT_PHYSICAL_ACTIVITIES: MOSTLY

## 2025-04-15 ENCOUNTER — APPOINTMENT (OUTPATIENT)
Dept: OBSTETRICS AND GYNECOLOGY | Facility: CLINIC | Age: 55
End: 2025-04-15
Payer: COMMERCIAL

## 2025-05-07 ENCOUNTER — APPOINTMENT (OUTPATIENT)
Dept: OBSTETRICS AND GYNECOLOGY | Facility: CLINIC | Age: 55
End: 2025-05-07
Payer: COMMERCIAL

## 2025-06-12 ENCOUNTER — APPOINTMENT (OUTPATIENT)
Dept: OBSTETRICS AND GYNECOLOGY | Facility: CLINIC | Age: 55
End: 2025-06-12
Payer: COMMERCIAL

## 2025-06-12 VITALS
DIASTOLIC BLOOD PRESSURE: 74 MMHG | BODY MASS INDEX: 41.92 KG/M2 | SYSTOLIC BLOOD PRESSURE: 113 MMHG | WEIGHT: 276.6 LBS | HEIGHT: 68 IN

## 2025-06-12 DIAGNOSIS — R14.0 BLOATING: ICD-10-CM

## 2025-06-12 DIAGNOSIS — Z80.3 FAMILY HISTORY OF BREAST CANCER: ICD-10-CM

## 2025-06-12 DIAGNOSIS — R92.8 ABNORMAL FINDINGS ON DIAGNOSTIC IMAGING OF BREAST: ICD-10-CM

## 2025-06-12 DIAGNOSIS — N92.1 MENORRHAGIA WITH IRREGULAR CYCLE: ICD-10-CM

## 2025-06-12 DIAGNOSIS — Z01.419 ENCOUNTER FOR ANNUAL ROUTINE GYNECOLOGICAL EXAMINATION: Primary | ICD-10-CM

## 2025-06-12 DIAGNOSIS — N64.52 NIPPLE DISCHARGE: ICD-10-CM

## 2025-06-12 DIAGNOSIS — Z91.89 INCREASED RISK OF BREAST CANCER: ICD-10-CM

## 2025-06-12 PROCEDURE — 1036F TOBACCO NON-USER: CPT

## 2025-06-12 PROCEDURE — 99396 PREV VISIT EST AGE 40-64: CPT

## 2025-06-12 PROCEDURE — 3008F BODY MASS INDEX DOCD: CPT

## 2025-06-12 RX ORDER — ETANERCEPT 50 MG/ML
SOLUTION SUBCUTANEOUS
COMMUNITY
Start: 2025-05-19

## 2025-06-12 ASSESSMENT — ENCOUNTER SYMPTOMS
COUGH: 0
COLOR CHANGE: 0
UNEXPECTED WEIGHT CHANGE: 0
HEADACHES: 0
DIZZINESS: 0
DEPRESSION: 0
DYSURIA: 0
SHORTNESS OF BREATH: 0
FATIGUE: 0
NAUSEA: 0
FEVER: 0
OCCASIONAL FEELINGS OF UNSTEADINESS: 0
VOMITING: 0
LOSS OF SENSATION IN FEET: 0
CHILLS: 0
ABDOMINAL PAIN: 0

## 2025-06-12 ASSESSMENT — PATIENT HEALTH QUESTIONNAIRE - PHQ9
2. FEELING DOWN, DEPRESSED OR HOPELESS: NOT AT ALL
1. LITTLE INTEREST OR PLEASURE IN DOING THINGS: NOT AT ALL
SUM OF ALL RESPONSES TO PHQ9 QUESTIONS 1 & 2: 0

## 2025-06-12 ASSESSMENT — LIFESTYLE VARIABLES
HOW OFTEN DO YOU HAVE A DRINK CONTAINING ALCOHOL: MONTHLY OR LESS
AUDIT-C TOTAL SCORE: 1
HOW MANY STANDARD DRINKS CONTAINING ALCOHOL DO YOU HAVE ON A TYPICAL DAY: 1 OR 2
HOW OFTEN DO YOU HAVE SIX OR MORE DRINKS ON ONE OCCASION: NEVER
SKIP TO QUESTIONS 9-10: 1

## 2025-06-12 ASSESSMENT — PAIN SCALES - GENERAL: PAINLEVEL_OUTOF10: 0-NO PAIN

## 2025-06-12 NOTE — PROGRESS NOTES
"Subjective   Camilla Doty is a 54 y.o. female who is here for a routine GYN exam. Last saw Dr. Bah 2023.    - Reports menstrual cycles come about once monthly, but she has noticed lengthening between cycles; bleeding usually last 5 to 6 days duration; feels her flow is much heavier than previous years; usually 3 days of heavy bleeding where she will use ultra tampons and soak them within 2 hours; last 2 to 3 days are light flow.    - C/o \"bloating\" and \"pressure\" along lower right abdomen/near pelvis region the past few months; unsure if it is related to her RA as this mainly affects her right side of her body and has been significantly worse recently.     - C/o few episodes of \"brown discharge\" that has come from central nipple when she squeezes it, usually has occurred right before her menses; denies known masses or lumps; denies associated pain or nipple inversion or skin changes; denies current right breast nipple discharge or changes; she has had some tenderness near her armpit region.   - Completed screening luz marina 2024 followed by right DX imaging 2025 requiring biopsy; pathology benign, negative for malignancy; saw breast oncology at that time. She declines report nipple discharge when seen for these visits. She has a current 6 month follow up right DX luz marina order in place by oncology.    - Denies vaginal symptoms or concerns today.      Complaints:   see hpi  Periods: overall fairly regular, cycle length duration can vary/lengthen   Dysmenorrhea:  none    Current contraception: not currently   History of abnormal Pap smear: no  History of abnormal mammogram: no      OB History          3    Para   3    Term   3            AB        Living   3         SAB        IAB        Ectopic        Multiple        Live Births   3                  Review of Systems   Constitutional:  Negative for chills, fatigue, fever and unexpected weight change.        + left nipple discharge   Respiratory:  " "Negative for cough and shortness of breath.    Gastrointestinal:  Negative for abdominal pain, nausea and vomiting.        + bloating/pressure   Genitourinary:  Positive for menstrual problem. Negative for dyspareunia, dysuria, pelvic pain and vaginal discharge.   Skin:  Negative for color change and rash.   Neurological:  Negative for dizziness and headaches.       Objective   /74   Ht 1.727 m (5' 8\")   Wt 125 kg (276 lb 9.6 oz)   LMP 05/26/2025   BMI 42.06 kg/m²        General:   Alert and oriented, in no acute distress   Neck: Supple. No visible thyromegaly.    Breast/Axilla: Normal to palpation bilaterally without masses, skin changes, or nipple discharge.    Abdomen: Soft, non-tender, without masses or organomegaly   Vulva: Normal architecture without erythema, masses, or lesions.    Vagina: Normal mucosa without lesions, masses, or atrophy. No abnormal vaginal discharge.    Cervix: Grossly normal without masses, lesions, or signs of cervicitis   Uterus: Grossly normal, non-tender, limited exam due to body habitus   Adnexa: Grossly normal, non-tender, limited exam due to body habitus   Pelvic Floor Normal    Psych Normal affect. Normal mood.      Assessment/Plan   Diagnoses and all orders for this visit:  Encounter for annual routine gynecological examination   - UTD on pap smear, next due 4/2026.  Abnormal findings on diagnostic imaging of breast  -     BI mammo bilateral diagnostic tomosynthesis; Future  -     BI US breast limited left; Future  Nipple discharge  -     BI mammo bilateral diagnostic tomosynthesis; Future  -     BI US breast limited left; Future  -     TSH with reflex to Free T4 if abnormal; Future  -     Prolactin; Future  - Patient reports this occurs with stimulation/squeezing and before menses; reports a few episodes, unsure how many.  - Will further evaluate with diagnostic imaging as she is due for her right breast imaging 7/2025, as well as with hormonal labs.  - Advised to " avoid squeezing or stimulating breast at this time.   - I advised she call the office if this occurs again before her mammogram as I will advise she see the breast clinic as well for evaluation.   Bloating  -     US PELVIS TRANSABDOMINAL WITH TRANSVAGINAL; Future  Menorrhagia with irregular cycle  -     US PELVIS TRANSABDOMINAL WITH TRANSVAGINAL; Future  - We did review perimenopause and abnormal irregular bleeding patterns that can occur during perimenopausal transition.   Family history of breast cancer   - Family history of breast cancer (mom at 42 yo); uncle BRCA1 positive.   - Can do breast MRI at 6 month intervals between mammograms; no history on file of any previous breast MRI imaging.   Increased risk of breast cancer  - Elevated TC score on mammogram 12/2024.   - Family history of breast cancer (mom at 42 yo); uncle BRCA1 positive.   - Can do breast MRI at 6 month intervals between mammograms; no history on file of any previous breast MRI imaging.     Stacy Bergeron PA-C

## 2025-07-24 ENCOUNTER — APPOINTMENT (OUTPATIENT)
Dept: RADIOLOGY | Facility: HOSPITAL | Age: 55
End: 2025-07-24
Payer: COMMERCIAL

## 2025-07-24 ENCOUNTER — HOSPITAL ENCOUNTER (OUTPATIENT)
Dept: RADIOLOGY | Facility: HOSPITAL | Age: 55
Discharge: HOME | End: 2025-07-24
Payer: COMMERCIAL

## 2025-07-24 DIAGNOSIS — N64.52 NIPPLE DISCHARGE: ICD-10-CM

## 2025-07-24 DIAGNOSIS — R92.8 ABNORMAL FINDINGS ON DIAGNOSTIC IMAGING OF BREAST: ICD-10-CM

## 2025-07-24 DIAGNOSIS — R14.0 BLOATING: ICD-10-CM

## 2025-07-24 DIAGNOSIS — N92.1 MENORRHAGIA WITH IRREGULAR CYCLE: ICD-10-CM

## 2025-07-24 PROCEDURE — 76642 ULTRASOUND BREAST LIMITED: CPT | Mod: LT

## 2025-07-24 PROCEDURE — 77062 BREAST TOMOSYNTHESIS BI: CPT | Performed by: RADIOLOGY

## 2025-07-24 PROCEDURE — 76642 ULTRASOUND BREAST LIMITED: CPT | Performed by: RADIOLOGY

## 2025-07-24 PROCEDURE — 77066 DX MAMMO INCL CAD BI: CPT | Performed by: RADIOLOGY

## 2025-07-24 PROCEDURE — 76830 TRANSVAGINAL US NON-OB: CPT

## 2025-07-24 PROCEDURE — 77062 BREAST TOMOSYNTHESIS BI: CPT

## 2025-07-31 DIAGNOSIS — N64.52 NIPPLE DISCHARGE: Primary | ICD-10-CM

## 2025-07-31 DIAGNOSIS — Z91.89 INCREASED RISK OF BREAST CANCER: ICD-10-CM

## 2025-07-31 DIAGNOSIS — Z80.3 FAMILY HISTORY OF BREAST CANCER: ICD-10-CM

## 2025-08-06 ENCOUNTER — APPOINTMENT (OUTPATIENT)
Dept: PRIMARY CARE | Facility: CLINIC | Age: 55
End: 2025-08-06
Payer: COMMERCIAL